# Patient Record
Sex: MALE | Race: WHITE | NOT HISPANIC OR LATINO | URBAN - METROPOLITAN AREA
[De-identification: names, ages, dates, MRNs, and addresses within clinical notes are randomized per-mention and may not be internally consistent; named-entity substitution may affect disease eponyms.]

---

## 2018-01-16 ENCOUNTER — OUTPATIENT (OUTPATIENT)
Dept: OUTPATIENT SERVICES | Facility: HOSPITAL | Age: 83
LOS: 1 days | End: 2018-01-16
Payer: MEDICARE

## 2018-01-16 VITALS
DIASTOLIC BLOOD PRESSURE: 61 MMHG | RESPIRATION RATE: 16 BRPM | TEMPERATURE: 99 F | SYSTOLIC BLOOD PRESSURE: 150 MMHG | HEIGHT: 67 IN | WEIGHT: 141.1 LBS | HEART RATE: 61 BPM

## 2018-01-16 DIAGNOSIS — K40.30 UNILATERAL INGUINAL HERNIA, WITH OBSTRUCTION, WITHOUT GANGRENE, NOT SPECIFIED AS RECURRENT: ICD-10-CM

## 2018-01-16 DIAGNOSIS — E11.9 TYPE 2 DIABETES MELLITUS WITHOUT COMPLICATIONS: ICD-10-CM

## 2018-01-16 DIAGNOSIS — Z90.89 ACQUIRED ABSENCE OF OTHER ORGANS: Chronic | ICD-10-CM

## 2018-01-16 DIAGNOSIS — Z01.818 ENCOUNTER FOR OTHER PREPROCEDURAL EXAMINATION: ICD-10-CM

## 2018-01-16 DIAGNOSIS — Z98.890 OTHER SPECIFIED POSTPROCEDURAL STATES: Chronic | ICD-10-CM

## 2018-01-16 LAB
ALBUMIN SERPL ELPH-MCNC: 3.4 G/DL — SIGNIFICANT CHANGE UP (ref 3.3–5)
ALP SERPL-CCNC: 44 U/L — SIGNIFICANT CHANGE UP (ref 40–120)
ALT FLD-CCNC: 29 U/L — SIGNIFICANT CHANGE UP (ref 12–78)
ANION GAP SERPL CALC-SCNC: 5 MMOL/L — SIGNIFICANT CHANGE UP (ref 5–17)
AST SERPL-CCNC: 16 U/L — SIGNIFICANT CHANGE UP (ref 15–37)
BILIRUB SERPL-MCNC: 0.6 MG/DL — SIGNIFICANT CHANGE UP (ref 0.2–1.2)
BUN SERPL-MCNC: 22 MG/DL — SIGNIFICANT CHANGE UP (ref 7–23)
CALCIUM SERPL-MCNC: 8.2 MG/DL — LOW (ref 8.5–10.1)
CHLORIDE SERPL-SCNC: 108 MMOL/L — SIGNIFICANT CHANGE UP (ref 96–108)
CO2 SERPL-SCNC: 31 MMOL/L — SIGNIFICANT CHANGE UP (ref 22–31)
CREAT SERPL-MCNC: 0.67 MG/DL — SIGNIFICANT CHANGE UP (ref 0.5–1.3)
GLUCOSE SERPL-MCNC: 211 MG/DL — HIGH (ref 70–99)
HBA1C BLD-MCNC: 7.1 % — HIGH (ref 4–5.6)
HCT VFR BLD CALC: 36.5 % — LOW (ref 39–50)
HGB BLD-MCNC: 12.6 G/DL — LOW (ref 13–17)
MCHC RBC-ENTMCNC: 30.2 PG — SIGNIFICANT CHANGE UP (ref 27–34)
MCHC RBC-ENTMCNC: 34.6 GM/DL — SIGNIFICANT CHANGE UP (ref 32–36)
MCV RBC AUTO: 87.3 FL — SIGNIFICANT CHANGE UP (ref 80–100)
PLATELET # BLD AUTO: 122 K/UL — LOW (ref 150–400)
POTASSIUM SERPL-MCNC: 4.1 MMOL/L — SIGNIFICANT CHANGE UP (ref 3.5–5.3)
POTASSIUM SERPL-SCNC: 4.1 MMOL/L — SIGNIFICANT CHANGE UP (ref 3.5–5.3)
PROT SERPL-MCNC: 6.7 G/DL — SIGNIFICANT CHANGE UP (ref 6–8.3)
RBC # BLD: 4.18 M/UL — LOW (ref 4.2–5.8)
RBC # FLD: 11.8 % — SIGNIFICANT CHANGE UP (ref 10.3–14.5)
SODIUM SERPL-SCNC: 144 MMOL/L — SIGNIFICANT CHANGE UP (ref 135–145)
WBC # BLD: 5.4 K/UL — SIGNIFICANT CHANGE UP (ref 3.8–10.5)
WBC # FLD AUTO: 5.4 K/UL — SIGNIFICANT CHANGE UP (ref 3.8–10.5)

## 2018-01-16 PROCEDURE — 80053 COMPREHEN METABOLIC PANEL: CPT

## 2018-01-16 PROCEDURE — G0463: CPT

## 2018-01-16 PROCEDURE — 93005 ELECTROCARDIOGRAM TRACING: CPT

## 2018-01-16 PROCEDURE — 93010 ELECTROCARDIOGRAM REPORT: CPT

## 2018-01-16 PROCEDURE — 85027 COMPLETE CBC AUTOMATED: CPT

## 2018-01-16 PROCEDURE — 83036 HEMOGLOBIN GLYCOSYLATED A1C: CPT

## 2018-01-16 RX ORDER — SILODOSIN 4 MG/1
0 CAPSULE ORAL
Qty: 0 | Refills: 0 | COMMUNITY

## 2018-01-16 NOTE — H&P PST ADULT - HISTORY OF PRESENT ILLNESS
87 yo [facility] male with T2DM, presents to PST scheduled for a repair of left inguinal hernia with mesh on 1/26 with Dr. Alvarado. Report left groin "burning" with bulging. H/O BPH with nocturia and frequency.  Denies hematuria.

## 2018-01-16 NOTE — H&P PST ADULT - PMH
Benign prostatic hyperplasia, unspecified whether lower urinary tract symptoms present    BPH associated with nocturia    Type 2 diabetes mellitus without complication, without long-term current use of insulin    Unilateral inguinal hernia with obstruction and without gangrene, recurrence not specified

## 2018-01-16 NOTE — H&P PST ADULT - NSANTHOSAYNRD_GEN_A_CORE
No. MARLA screening performed.  STOP BANG Legend: 0-2 = LOW Risk; 3-4 = INTERMEDIATE Risk; 5-8 = HIGH Risk

## 2018-01-16 NOTE — H&P PST ADULT - PROBLEM SELECTOR PLAN 2
Labs- CBC, CMP, A1c and EKG  MC with Dr. Macias  Pre op and Hibiclens instructions reviewed and given to son. Take routine am meds DOS with sip of water. Avoid NSAIDs and OTC supplements. Verbalized understanding

## 2018-01-16 NOTE — H&P PST ADULT - PROBLEM SELECTOR PLAN 3
A1c pending. Advised son that patient would need endo clearance if a1c comes back 9 or above. Hold Januvia DOS. Fingerstick am of surgery. Verbalized understanding.

## 2018-01-16 NOTE — H&P PST ADULT - ASSESSMENT
89 yo male with a left inguinal hernia scheduled for a repair of left inguinal hernia with mesh on 1/26 with Dr. Alvarado

## 2018-01-25 RX ORDER — GLUCAGON INJECTION, SOLUTION 0.5 MG/.1ML
1 INJECTION, SOLUTION SUBCUTANEOUS ONCE
Qty: 0 | Refills: 0 | Status: DISCONTINUED | OUTPATIENT
Start: 2018-01-26 | End: 2018-02-10

## 2018-01-25 RX ORDER — DEXTROSE 50 % IN WATER 50 %
25 SYRINGE (ML) INTRAVENOUS ONCE
Qty: 0 | Refills: 0 | Status: DISCONTINUED | OUTPATIENT
Start: 2018-01-26 | End: 2018-02-10

## 2018-01-25 RX ORDER — DEXTROSE 50 % IN WATER 50 %
12.5 SYRINGE (ML) INTRAVENOUS ONCE
Qty: 0 | Refills: 0 | Status: DISCONTINUED | OUTPATIENT
Start: 2018-01-26 | End: 2018-02-10

## 2018-01-25 RX ORDER — DEXTROSE 50 % IN WATER 50 %
1 SYRINGE (ML) INTRAVENOUS ONCE
Qty: 0 | Refills: 0 | Status: DISCONTINUED | OUTPATIENT
Start: 2018-01-26 | End: 2018-02-10

## 2018-01-25 RX ORDER — SODIUM CHLORIDE 9 MG/ML
1000 INJECTION, SOLUTION INTRAVENOUS
Qty: 0 | Refills: 0 | Status: DISCONTINUED | OUTPATIENT
Start: 2018-01-26 | End: 2018-02-10

## 2018-01-26 ENCOUNTER — OUTPATIENT (OUTPATIENT)
Dept: OUTPATIENT SERVICES | Facility: HOSPITAL | Age: 83
LOS: 1 days | End: 2018-01-26
Payer: MEDICARE

## 2018-01-26 VITALS
SYSTOLIC BLOOD PRESSURE: 145 MMHG | HEART RATE: 64 BPM | RESPIRATION RATE: 15 BRPM | DIASTOLIC BLOOD PRESSURE: 66 MMHG | TEMPERATURE: 98 F | OXYGEN SATURATION: 96 %

## 2018-01-26 VITALS — DIASTOLIC BLOOD PRESSURE: 66 MMHG | SYSTOLIC BLOOD PRESSURE: 120 MMHG | HEART RATE: 75 BPM | TEMPERATURE: 98 F

## 2018-01-26 DIAGNOSIS — K40.30 UNILATERAL INGUINAL HERNIA, WITH OBSTRUCTION, WITHOUT GANGRENE, NOT SPECIFIED AS RECURRENT: ICD-10-CM

## 2018-01-26 DIAGNOSIS — Z90.89 ACQUIRED ABSENCE OF OTHER ORGANS: Chronic | ICD-10-CM

## 2018-01-26 DIAGNOSIS — Z98.890 OTHER SPECIFIED POSTPROCEDURAL STATES: Chronic | ICD-10-CM

## 2018-01-26 PROCEDURE — 49507 PRP I/HERN INIT BLOCK >5 YR: CPT | Mod: LT

## 2018-01-26 PROCEDURE — 88304 TISSUE EXAM BY PATHOLOGIST: CPT | Mod: 26

## 2018-01-26 PROCEDURE — 82962 GLUCOSE BLOOD TEST: CPT

## 2018-01-26 PROCEDURE — C1781: CPT

## 2018-01-26 PROCEDURE — 88304 TISSUE EXAM BY PATHOLOGIST: CPT

## 2018-01-26 RX ORDER — OXYCODONE HYDROCHLORIDE 5 MG/1
5 TABLET ORAL EVERY 4 HOURS
Qty: 0 | Refills: 0 | Status: DISCONTINUED | OUTPATIENT
Start: 2018-01-26 | End: 2018-01-26

## 2018-01-26 RX ORDER — SODIUM CHLORIDE 9 MG/ML
1000 INJECTION, SOLUTION INTRAVENOUS
Qty: 0 | Refills: 0 | Status: DISCONTINUED | OUTPATIENT
Start: 2018-01-26 | End: 2018-01-26

## 2018-01-26 RX ORDER — ONDANSETRON 8 MG/1
4 TABLET, FILM COATED ORAL ONCE
Qty: 0 | Refills: 0 | Status: DISCONTINUED | OUTPATIENT
Start: 2018-01-26 | End: 2018-01-26

## 2018-01-26 RX ORDER — CEFAZOLIN SODIUM 1 G
1000 VIAL (EA) INJECTION ONCE
Qty: 0 | Refills: 0 | Status: COMPLETED | OUTPATIENT
Start: 2018-01-26 | End: 2018-01-26

## 2018-01-26 RX ORDER — HYDROMORPHONE HYDROCHLORIDE 2 MG/ML
0.5 INJECTION INTRAMUSCULAR; INTRAVENOUS; SUBCUTANEOUS
Qty: 0 | Refills: 0 | Status: DISCONTINUED | OUTPATIENT
Start: 2018-01-26 | End: 2018-01-26

## 2018-01-26 RX ORDER — ACETAMINOPHEN 500 MG
1000 TABLET ORAL ONCE
Qty: 0 | Refills: 0 | Status: DISCONTINUED | OUTPATIENT
Start: 2018-01-26 | End: 2018-01-26

## 2018-01-26 RX ADMIN — SODIUM CHLORIDE 100 MILLILITER(S): 9 INJECTION, SOLUTION INTRAVENOUS at 07:18

## 2018-01-26 RX ADMIN — SODIUM CHLORIDE 100 MILLILITER(S): 9 INJECTION, SOLUTION INTRAVENOUS at 09:18

## 2018-01-26 NOTE — BRIEF OPERATIVE NOTE - COMMENTS
Repair Incarcerated Left Inguinal Hernia with Lipoma Spermatic Cord Ilioinguinal, Ilioinguinal Nerve Block

## 2018-01-26 NOTE — BRIEF OPERATIVE NOTE - POST-OP DX
Inguinal hernia  01/26/2018  Incarcerated Left Inguinal Hernia with Lipoma Spermatic Cord  Active  Ananth Alvarado

## 2018-01-26 NOTE — ASU DISCHARGE PLAN (ADULT/PEDIATRIC). - MEDICATION SUMMARY - MEDICATIONS TO TAKE
I will START or STAY ON the medications listed below when I get home from the hospital:    finasteride 5 mg oral tablet  -- 1 tab(s) by mouth once a day - IN AM  -- Indication: For HOME MEDICATION     Tylenol 325 mg oral tablet  -- 2 tab(s) by mouth every 4 hours  -- Indication: For PAIN    Norco 5 mg-325 mg oral tablet  -- 2 tab(s) by mouth every 6 hours MDD:8  -- Caution federal law prohibits the transfer of this drug to any person other  than the person for whom it was prescribed.  May cause drowsiness.  Alcohol may intensify this effect.  Use care when operating dangerous machinery.  This product contains acetaminophen.  Do not use  with any other product containing acetaminophen to prevent possible liver damage.  Using more of this medication than prescribed may cause serious breathing problems.    -- Indication: For PAIN    Milk of Magnesia  -- 30 CC BY MOUTH  FOR CONSTIPATION    -- Indication: For CONSTIPATION     Rapaflo 8 mg oral capsule  -- 1 cap(s) by mouth once a day - IN AM  -- Indication: For HOME MEDICATION     Januvia 100 mg oral tablet  -- 1 tab(s) by mouth once a day - IN AM  -- Indication: For HOME MEDICATION     pravastatin 10 mg oral tablet  -- 1 tab(s) by mouth once a day (at bedtime)  -- Indication: For HOME MEDICATION     Levobunolol 0.5% ophthalmic solution  -- 2 drop(s) to each affected eye once a day RIGHT EYE  -- Indication: For HOME MEDICATION

## 2018-01-26 NOTE — ASU DISCHARGE PLAN (ADULT/PEDIATRIC). - NOTIFY
Fever greater than 101/Unable to Urinate/Bleeding that does not stop Bleeding that does not stop/Fever greater than 101/Unable to Urinate/Pain not relieved by Medications

## 2018-01-29 LAB — SURGICAL PATHOLOGY FINAL REPORT - CH: SIGNIFICANT CHANGE UP

## 2019-11-20 ENCOUNTER — EMERGENCY (EMERGENCY)
Facility: HOSPITAL | Age: 84
LOS: 1 days | Discharge: HOME | End: 2019-11-20
Attending: EMERGENCY MEDICINE | Admitting: EMERGENCY MEDICINE
Payer: MEDICARE

## 2019-11-20 VITALS
OXYGEN SATURATION: 98 % | HEART RATE: 80 BPM | SYSTOLIC BLOOD PRESSURE: 135 MMHG | WEIGHT: 149.91 LBS | TEMPERATURE: 98 F | DIASTOLIC BLOOD PRESSURE: 70 MMHG | HEIGHT: 68 IN | RESPIRATION RATE: 16 BRPM

## 2019-11-20 DIAGNOSIS — Z98.890 OTHER SPECIFIED POSTPROCEDURAL STATES: Chronic | ICD-10-CM

## 2019-11-20 DIAGNOSIS — Z90.89 ACQUIRED ABSENCE OF OTHER ORGANS: Chronic | ICD-10-CM

## 2019-11-20 PROCEDURE — 70450 CT HEAD/BRAIN W/O DYE: CPT

## 2019-11-20 PROCEDURE — 99284 EMERGENCY DEPT VISIT MOD MDM: CPT | Mod: 25

## 2019-11-20 PROCEDURE — 70450 CT HEAD/BRAIN W/O DYE: CPT | Mod: 26

## 2019-11-20 PROCEDURE — 99284 EMERGENCY DEPT VISIT MOD MDM: CPT

## 2019-11-20 RX ORDER — FINASTERIDE 5 MG/1
1 TABLET, FILM COATED ORAL
Qty: 0 | Refills: 0 | DISCHARGE

## 2019-11-20 RX ORDER — SILODOSIN 4 MG/1
1 CAPSULE ORAL
Qty: 0 | Refills: 0 | DISCHARGE

## 2019-11-20 RX ORDER — LEVOBUNOLOL HCL 0.5 %
2 DROPS OPHTHALMIC (EYE)
Qty: 0 | Refills: 0 | DISCHARGE

## 2019-11-20 RX ORDER — MAGNESIUM HYDROXIDE 400 MG/1
0 TABLET, CHEWABLE ORAL
Qty: 0 | Refills: 0 | DISCHARGE

## 2019-11-20 RX ORDER — ACETAMINOPHEN 500 MG
2 TABLET ORAL
Qty: 0 | Refills: 0 | DISCHARGE

## 2019-11-20 RX ORDER — SITAGLIPTIN 50 MG/1
1 TABLET, FILM COATED ORAL
Qty: 0 | Refills: 0 | DISCHARGE

## 2019-11-20 NOTE — ED ADULT TRIAGE NOTE - CHIEF COMPLAINT QUOTE
unwitnessed fall, ems found patient sitting on the wheelchair, unknown LOC, hematoma to the right side of forehead

## 2019-11-20 NOTE — ED PROVIDER NOTE - PATIENT PORTAL LINK FT
You can access the FollowMyHealth Patient Portal offered by [facility] by registering at the following website: [email or web address]. By joining [facility]’s FollowMyHealth portal, you will also be able to view your health information using other applications (apps) compatible with our system.

## 2019-11-20 NOTE — ED ADULT NURSE NOTE - NSIMPLEMENTINTERV_GEN_ALL_ED
Implemented All Fall Risk Interventions:  [place] to call system. Call bell, personal items and telephone within reach. Instruct patient to call for assistance. Room bathroom lighting operational. Non-slip footwear when patient is off stretcher. Physically safe environment: no spills, clutter or unnecessary equipment. Stretcher in lowest position, wheels locked, appropriate side rails in place. Provide visual cue, wrist band, yellow gown, etc. Monitor gait and stability. Monitor for mental status changes and reorient to person, place, and time. Review medications for side effects contributing to fall risk. Reinforce activity limits and safety measures with patient and family.

## 2019-11-20 NOTE — ED PROVIDER NOTE - ATTENDING CONTRIBUTION TO CARE
I have personally performed a face to face diagnostic evaluation on this patient.  I have reviewed the PA note and agree with the history, exam, and plan of care, except as noted.  History and Exam by me shows patient sent from [facility] with report of fall out of wheelchair, hit head, patient has dementia, son at the bedside states patient is at normal mental baseline, patient awake, follows commands, right forehead hematoma/eccymosis, no laceration, f/u ct head.

## 2019-11-20 NOTE — ED ADULT NURSE NOTE - OBJECTIVE STATEMENT
Per son, pt fell while trying to get out of his wheelchair at [facility] yesterday and has a lump on his head on the right temporal/forehead region, pt has dementia, but denies pain, and son states that he is not on blood thinners.

## 2019-11-20 NOTE — ED PROVIDER NOTE - OBJECTIVE STATEMENT
pt Is a 89yo male with pmhx of dementia and dm presents s/p fall. pt at nursing [facility], per nursing assistant pt fell out of wheelchair when she turned around. unsure if pt had episode of loc. pt son at bedside advised pt acting himself.

## 2019-11-21 VITALS
HEART RATE: 61 BPM | SYSTOLIC BLOOD PRESSURE: 124 MMHG | TEMPERATURE: 97 F | DIASTOLIC BLOOD PRESSURE: 71 MMHG | OXYGEN SATURATION: 98 % | RESPIRATION RATE: 16 BRPM

## 2019-11-21 PROBLEM — N40.0 BENIGN PROSTATIC HYPERPLASIA WITHOUT LOWER URINARY TRACT SYMPTOMS: Chronic | Status: ACTIVE | Noted: 2018-01-16

## 2019-11-21 PROBLEM — E11.9 TYPE 2 DIABETES MELLITUS WITHOUT COMPLICATIONS: Chronic | Status: ACTIVE | Noted: 2018-01-16

## 2019-11-21 PROBLEM — N40.1 BENIGN PROSTATIC HYPERPLASIA WITH LOWER URINARY TRACT SYMPTOMS: Chronic | Status: ACTIVE | Noted: 2018-01-16

## 2019-11-21 PROBLEM — K40.30 UNILATERAL INGUINAL HERNIA, WITH OBSTRUCTION, WITHOUT GANGRENE, NOT SPECIFIED AS RECURRENT: Chronic | Status: ACTIVE | Noted: 2018-01-16

## 2019-11-21 NOTE — ED ADULT NURSE REASSESSMENT NOTE - NS ED NURSE REASSESS COMMENT FT1
Report given to Ira MENDOZA at [employer] at [place]. Ambulance booked for pt to return to facility.

## 2020-08-15 ENCOUNTER — EMERGENCY (EMERGENCY)
Facility: HOSPITAL | Age: 85
LOS: 1 days | Discharge: ANOTHER HOSPITAL | End: 2020-08-15
Attending: EMERGENCY MEDICINE | Admitting: EMERGENCY MEDICINE
Payer: MEDICARE

## 2020-08-15 ENCOUNTER — INPATIENT (INPATIENT)
Facility: HOSPITAL | Age: 85
LOS: 1 days | Discharge: NURSING, REHAB OR LONG-TERM CARE | DRG: 640 | End: 2020-08-17
Attending: INTERNAL MEDICINE | Admitting: INTERNAL MEDICINE
Payer: MEDICARE

## 2020-08-15 VITALS
DIASTOLIC BLOOD PRESSURE: 70 MMHG | HEART RATE: 67 BPM | TEMPERATURE: 98 F | HEIGHT: 67 IN | SYSTOLIC BLOOD PRESSURE: 171 MMHG | OXYGEN SATURATION: 96 % | RESPIRATION RATE: 18 BRPM

## 2020-08-15 VITALS
HEIGHT: 67 IN | RESPIRATION RATE: 18 BRPM | DIASTOLIC BLOOD PRESSURE: 60 MMHG | OXYGEN SATURATION: 100 % | SYSTOLIC BLOOD PRESSURE: 151 MMHG | WEIGHT: 154.98 LBS | HEART RATE: 70 BPM | TEMPERATURE: 97 F

## 2020-08-15 VITALS
HEART RATE: 66 BPM | SYSTOLIC BLOOD PRESSURE: 170 MMHG | RESPIRATION RATE: 18 BRPM | DIASTOLIC BLOOD PRESSURE: 79 MMHG | OXYGEN SATURATION: 96 % | TEMPERATURE: 98 F

## 2020-08-15 DIAGNOSIS — Z29.9 ENCOUNTER FOR PROPHYLACTIC MEASURES, UNSPECIFIED: ICD-10-CM

## 2020-08-15 DIAGNOSIS — E87.1 HYPO-OSMOLALITY AND HYPONATREMIA: ICD-10-CM

## 2020-08-15 DIAGNOSIS — E11.9 TYPE 2 DIABETES MELLITUS WITHOUT COMPLICATIONS: ICD-10-CM

## 2020-08-15 DIAGNOSIS — F03.91 UNSPECIFIED DEMENTIA WITH BEHAVIORAL DISTURBANCE: ICD-10-CM

## 2020-08-15 DIAGNOSIS — Z90.89 ACQUIRED ABSENCE OF OTHER ORGANS: Chronic | ICD-10-CM

## 2020-08-15 DIAGNOSIS — Z98.890 OTHER SPECIFIED POSTPROCEDURAL STATES: Chronic | ICD-10-CM

## 2020-08-15 DIAGNOSIS — R41.82 ALTERED MENTAL STATUS, UNSPECIFIED: ICD-10-CM

## 2020-08-15 LAB
ALBUMIN SERPL ELPH-MCNC: 3.2 G/DL — LOW (ref 3.3–5)
ALP SERPL-CCNC: 81 U/L — SIGNIFICANT CHANGE UP (ref 30–120)
ALT FLD-CCNC: 38 U/L DA — SIGNIFICANT CHANGE UP (ref 10–60)
ANION GAP SERPL CALC-SCNC: 0 MMOL/L — LOW (ref 5–17)
APPEARANCE UR: CLEAR — SIGNIFICANT CHANGE UP
AST SERPL-CCNC: 33 U/L — SIGNIFICANT CHANGE UP (ref 10–40)
BASOPHILS # BLD AUTO: 0.03 K/UL — SIGNIFICANT CHANGE UP (ref 0–0.2)
BASOPHILS NFR BLD AUTO: 0.7 % — SIGNIFICANT CHANGE UP (ref 0–2)
BILIRUB SERPL-MCNC: 0.6 MG/DL — SIGNIFICANT CHANGE UP (ref 0.2–1.2)
BILIRUB UR-MCNC: NEGATIVE — SIGNIFICANT CHANGE UP
BUN SERPL-MCNC: 19 MG/DL — SIGNIFICANT CHANGE UP (ref 7–23)
CALCIUM SERPL-MCNC: 8.5 MG/DL — SIGNIFICANT CHANGE UP (ref 8.4–10.5)
CHLORIDE SERPL-SCNC: 93 MMOL/L — LOW (ref 96–108)
CO2 SERPL-SCNC: 36 MMOL/L — HIGH (ref 22–31)
COLOR SPEC: YELLOW — SIGNIFICANT CHANGE UP
CREAT SERPL-MCNC: 0.8 MG/DL — SIGNIFICANT CHANGE UP (ref 0.5–1.3)
DIFF PNL FLD: NEGATIVE — SIGNIFICANT CHANGE UP
EOSINOPHIL # BLD AUTO: 0.18 K/UL — SIGNIFICANT CHANGE UP (ref 0–0.5)
EOSINOPHIL NFR BLD AUTO: 4 % — SIGNIFICANT CHANGE UP (ref 0–6)
GLUCOSE BLDC GLUCOMTR-MCNC: 145 MG/DL — HIGH (ref 70–99)
GLUCOSE SERPL-MCNC: 126 MG/DL — HIGH (ref 70–99)
GLUCOSE UR QL: NEGATIVE MG/DL — SIGNIFICANT CHANGE UP
HCT VFR BLD CALC: 33.7 % — LOW (ref 39–50)
HGB BLD-MCNC: 11.2 G/DL — LOW (ref 13–17)
IMM GRANULOCYTES NFR BLD AUTO: 0.2 % — SIGNIFICANT CHANGE UP (ref 0–1.5)
KETONES UR-MCNC: NEGATIVE — SIGNIFICANT CHANGE UP
LACTATE SERPL-SCNC: 2 MMOL/L — SIGNIFICANT CHANGE UP (ref 0.7–2)
LEUKOCYTE ESTERASE UR-ACNC: NEGATIVE — SIGNIFICANT CHANGE UP
LYMPHOCYTES # BLD AUTO: 1.25 K/UL — SIGNIFICANT CHANGE UP (ref 1–3.3)
LYMPHOCYTES # BLD AUTO: 27.9 % — SIGNIFICANT CHANGE UP (ref 13–44)
MCHC RBC-ENTMCNC: 29.6 PG — SIGNIFICANT CHANGE UP (ref 27–34)
MCHC RBC-ENTMCNC: 33.2 GM/DL — SIGNIFICANT CHANGE UP (ref 32–36)
MCV RBC AUTO: 89.2 FL — SIGNIFICANT CHANGE UP (ref 80–100)
MONOCYTES # BLD AUTO: 0.41 K/UL — SIGNIFICANT CHANGE UP (ref 0–0.9)
MONOCYTES NFR BLD AUTO: 9.2 % — SIGNIFICANT CHANGE UP (ref 2–14)
NEUTROPHILS # BLD AUTO: 2.6 K/UL — SIGNIFICANT CHANGE UP (ref 1.8–7.4)
NEUTROPHILS NFR BLD AUTO: 58 % — SIGNIFICANT CHANGE UP (ref 43–77)
NITRITE UR-MCNC: NEGATIVE — SIGNIFICANT CHANGE UP
NRBC # BLD: 0 /100 WBCS — SIGNIFICANT CHANGE UP (ref 0–0)
NT-PROBNP SERPL-SCNC: 665 PG/ML — HIGH (ref 0–450)
PH UR: 8 — SIGNIFICANT CHANGE UP (ref 5–8)
PLATELET # BLD AUTO: 146 K/UL — LOW (ref 150–400)
POTASSIUM SERPL-MCNC: 4.4 MMOL/L — SIGNIFICANT CHANGE UP (ref 3.5–5.3)
POTASSIUM SERPL-SCNC: 4.4 MMOL/L — SIGNIFICANT CHANGE UP (ref 3.5–5.3)
PROT SERPL-MCNC: 7.4 G/DL — SIGNIFICANT CHANGE UP (ref 6–8.3)
PROT UR-MCNC: NEGATIVE MG/DL — SIGNIFICANT CHANGE UP
RBC # BLD: 3.78 M/UL — LOW (ref 4.2–5.8)
RBC # FLD: 13.9 % — SIGNIFICANT CHANGE UP (ref 10.3–14.5)
SARS-COV-2 RNA SPEC QL NAA+PROBE: SIGNIFICANT CHANGE UP
SODIUM SERPL-SCNC: 129 MMOL/L — LOW (ref 135–145)
SP GR SPEC: 1.01 — SIGNIFICANT CHANGE UP (ref 1.01–1.02)
TROPONIN I SERPL-MCNC: 0 NG/ML — LOW (ref 0.02–0.06)
UROBILINOGEN FLD QL: 1 MG/DL
VALPROATE SERPL-MCNC: 40 UG/ML — LOW (ref 50–100)
WBC # BLD: 4.48 K/UL — SIGNIFICANT CHANGE UP (ref 3.8–10.5)
WBC # FLD AUTO: 4.48 K/UL — SIGNIFICANT CHANGE UP (ref 3.8–10.5)

## 2020-08-15 PROCEDURE — 71045 X-RAY EXAM CHEST 1 VIEW: CPT | Mod: 26

## 2020-08-15 PROCEDURE — 83880 ASSAY OF NATRIURETIC PEPTIDE: CPT

## 2020-08-15 PROCEDURE — 96360 HYDRATION IV INFUSION INIT: CPT

## 2020-08-15 PROCEDURE — 70450 CT HEAD/BRAIN W/O DYE: CPT | Mod: 26

## 2020-08-15 PROCEDURE — 84484 ASSAY OF TROPONIN QUANT: CPT

## 2020-08-15 PROCEDURE — 87040 BLOOD CULTURE FOR BACTERIA: CPT

## 2020-08-15 PROCEDURE — 93005 ELECTROCARDIOGRAM TRACING: CPT

## 2020-08-15 PROCEDURE — 99283 EMERGENCY DEPT VISIT LOW MDM: CPT

## 2020-08-15 PROCEDURE — 70450 CT HEAD/BRAIN W/O DYE: CPT

## 2020-08-15 PROCEDURE — 83605 ASSAY OF LACTIC ACID: CPT

## 2020-08-15 PROCEDURE — 80164 ASSAY DIPROPYLACETIC ACD TOT: CPT

## 2020-08-15 PROCEDURE — 99285 EMERGENCY DEPT VISIT HI MDM: CPT | Mod: 25

## 2020-08-15 PROCEDURE — 85027 COMPLETE CBC AUTOMATED: CPT

## 2020-08-15 PROCEDURE — 99285 EMERGENCY DEPT VISIT HI MDM: CPT

## 2020-08-15 PROCEDURE — 87086 URINE CULTURE/COLONY COUNT: CPT

## 2020-08-15 PROCEDURE — 71045 X-RAY EXAM CHEST 1 VIEW: CPT

## 2020-08-15 PROCEDURE — 81003 URINALYSIS AUTO W/O SCOPE: CPT

## 2020-08-15 PROCEDURE — 93010 ELECTROCARDIOGRAM REPORT: CPT

## 2020-08-15 PROCEDURE — 36415 COLL VENOUS BLD VENIPUNCTURE: CPT

## 2020-08-15 PROCEDURE — 82962 GLUCOSE BLOOD TEST: CPT

## 2020-08-15 PROCEDURE — 80053 COMPREHEN METABOLIC PANEL: CPT

## 2020-08-15 RX ORDER — INSULIN LISPRO 100/ML
VIAL (ML) SUBCUTANEOUS
Refills: 0 | Status: DISCONTINUED | OUTPATIENT
Start: 2020-08-15 | End: 2020-08-17

## 2020-08-15 RX ORDER — DEXTROSE 50 % IN WATER 50 %
15 SYRINGE (ML) INTRAVENOUS ONCE
Refills: 0 | Status: DISCONTINUED | OUTPATIENT
Start: 2020-08-15 | End: 2020-08-17

## 2020-08-15 RX ORDER — QUETIAPINE FUMARATE 200 MG/1
25 TABLET, FILM COATED ORAL THREE TIMES A DAY
Refills: 0 | Status: DISCONTINUED | OUTPATIENT
Start: 2020-08-15 | End: 2020-08-17

## 2020-08-15 RX ORDER — DEXTROSE 50 % IN WATER 50 %
25 SYRINGE (ML) INTRAVENOUS ONCE
Refills: 0 | Status: DISCONTINUED | OUTPATIENT
Start: 2020-08-15 | End: 2020-08-17

## 2020-08-15 RX ORDER — SODIUM CHLORIDE 9 MG/ML
1000 INJECTION INTRAMUSCULAR; INTRAVENOUS; SUBCUTANEOUS
Refills: 0 | Status: DISCONTINUED | OUTPATIENT
Start: 2020-08-15 | End: 2020-08-17

## 2020-08-15 RX ORDER — DEXTROSE 50 % IN WATER 50 %
12.5 SYRINGE (ML) INTRAVENOUS ONCE
Refills: 0 | Status: DISCONTINUED | OUTPATIENT
Start: 2020-08-15 | End: 2020-08-17

## 2020-08-15 RX ORDER — LANOLIN ALCOHOL/MO/W.PET/CERES
5 CREAM (GRAM) TOPICAL AT BEDTIME
Refills: 0 | Status: DISCONTINUED | OUTPATIENT
Start: 2020-08-15 | End: 2020-08-17

## 2020-08-15 RX ORDER — ALOGLIPTIN 12.5 MG/1
1 TABLET, FILM COATED ORAL
Qty: 0 | Refills: 0 | DISCHARGE

## 2020-08-15 RX ORDER — SODIUM CHLORIDE 9 MG/ML
1000 INJECTION INTRAMUSCULAR; INTRAVENOUS; SUBCUTANEOUS
Refills: 0 | Status: COMPLETED | OUTPATIENT
Start: 2020-08-15 | End: 2020-08-15

## 2020-08-15 RX ORDER — SODIUM CHLORIDE 9 MG/ML
1000 INJECTION, SOLUTION INTRAVENOUS
Refills: 0 | Status: DISCONTINUED | OUTPATIENT
Start: 2020-08-15 | End: 2020-08-17

## 2020-08-15 RX ORDER — LANOLIN ALCOHOL/MO/W.PET/CERES
1 CREAM (GRAM) TOPICAL
Qty: 0 | Refills: 0 | DISCHARGE

## 2020-08-15 RX ORDER — ASPIRIN/CALCIUM CARB/MAGNESIUM 324 MG
1 TABLET ORAL
Qty: 0 | Refills: 0 | DISCHARGE

## 2020-08-15 RX ORDER — DIVALPROEX SODIUM 500 MG/1
250 TABLET, DELAYED RELEASE ORAL
Refills: 0 | Status: DISCONTINUED | OUTPATIENT
Start: 2020-08-15 | End: 2020-08-17

## 2020-08-15 RX ORDER — RISPERIDONE 4 MG/1
0 TABLET ORAL
Qty: 0 | Refills: 0 | DISCHARGE

## 2020-08-15 RX ORDER — ENOXAPARIN SODIUM 100 MG/ML
40 INJECTION SUBCUTANEOUS DAILY
Refills: 0 | Status: DISCONTINUED | OUTPATIENT
Start: 2020-08-15 | End: 2020-08-17

## 2020-08-15 RX ORDER — GLUCAGON INJECTION, SOLUTION 0.5 MG/.1ML
1 INJECTION, SOLUTION SUBCUTANEOUS ONCE
Refills: 0 | Status: DISCONTINUED | OUTPATIENT
Start: 2020-08-15 | End: 2020-08-17

## 2020-08-15 RX ADMIN — SODIUM CHLORIDE 75 MILLILITER(S): 9 INJECTION INTRAMUSCULAR; INTRAVENOUS; SUBCUTANEOUS at 23:10

## 2020-08-15 RX ADMIN — SODIUM CHLORIDE 1000 MILLILITER(S): 9 INJECTION INTRAMUSCULAR; INTRAVENOUS; SUBCUTANEOUS at 18:00

## 2020-08-15 RX ADMIN — SODIUM CHLORIDE 1000 MILLILITER(S): 9 INJECTION INTRAMUSCULAR; INTRAVENOUS; SUBCUTANEOUS at 16:22

## 2020-08-15 RX ADMIN — SODIUM CHLORIDE 1000 MILLILITER(S): 9 INJECTION INTRAMUSCULAR; INTRAVENOUS; SUBCUTANEOUS at 18:37

## 2020-08-15 NOTE — ED ADULT NURSE NOTE - PMH
Benign prostatic hyperplasia, unspecified whether lower urinary tract symptoms present    BPH associated with nocturia    Dementia    Type 2 diabetes mellitus without complication, without long-term current use of insulin    Unilateral inguinal hernia with obstruction and without gangrene, recurrence not specified

## 2020-08-15 NOTE — ED PROVIDER NOTE - CARE PLAN
Principal Discharge DX:	Hyponatremia  Secondary Diagnosis:	Dementia  Secondary Diagnosis:	AMS (altered mental status)

## 2020-08-15 NOTE — H&P ADULT - NSICDXPASTMEDICALHX_GEN_ALL_CORE_FT
PAST MEDICAL HISTORY:  Benign prostatic hyperplasia, unspecified whether lower urinary tract symptoms present     BPH associated with nocturia     Dementia     Type 2 diabetes mellitus without complication, without long-term current use of insulin     Unilateral inguinal hernia with obstruction and without gangrene, recurrence not specified

## 2020-08-15 NOTE — H&P ADULT - HISTORY OF PRESENT ILLNESS
89yo male resident of [facility] with PMH of Dementia with behav disorder,psychosis, dysphagia,DM2,BPH, dermatitis, sent in for AMS, with worsening confusion, In ED afebrile, vital signs stable, hyponatremia ,  ADmitted for AMS likely metabolic encephalopathy due to hyponatremia   worsening Dementia with behavioural disorder

## 2020-08-15 NOTE — H&P ADULT - ASSESSMENT
89yo male resident of [facility] with PMH of Dementia with behav disorder,psychosis, dysphagia,DM2,BPH, dermatitis, sent in for AMS, with worsening confusion, In ED afebrile, vital signs stable, hyponatremia ,  ADmitted for AMS likely metabolic encephalopathy due to hyponatremia   worsening Dementia with behavioural disorder  goals of care and advanced care plan to be discussed with family  family called and message left    45 minutes spent on this visit, 50% visit time spent in care co-ordination with other attendings and counselling patient

## 2020-08-15 NOTE — ED PROVIDER NOTE - MUSCULOSKELETAL, MLM
Spine appears normal, range of motion is not limited, no muscle or joint tenderness +1 pitting edema, in lower legs

## 2020-08-15 NOTE — ED PROVIDER NOTE - PROGRESS NOTE DETAILS
Labs revealed hyponatremia, Dr Brizuela in to examine pt. Wants to transfer to [place] for admission. Dr Berrios accepting.

## 2020-08-15 NOTE — ED ADULT NURSE NOTE - OBJECTIVE STATEMENT
Pt. brought to ED, transferred from [facility] for hyponatremia. Pt. unable to provide details, oriented to self.

## 2020-08-15 NOTE — ED ADULT NURSE REASSESSMENT NOTE - NS ED NURSE REASSESS COMMENT FT1
spoke to son jese who is aware of plan of care. consents to transfer to [place] for admission. no distress or sob noted.

## 2020-08-15 NOTE — ED PROVIDER NOTE - OBJECTIVE STATEMENT
89yo male sent from [place] with ams and hyponatremia, pt is unable to give hx due to dementia but pt was found to have low sodium and was combative at the facility, no vomiting no fever

## 2020-08-15 NOTE — ED ADULT TRIAGE NOTE - CHIEF COMPLAINT QUOTE
As per EMS, sent from [place] [facility] with change in mental status, lethargic "  Pt awake, screaming/ agitated on arrival to ED

## 2020-08-15 NOTE — ED ADULT NURSE NOTE - NSIMPLEMENTINTERV_GEN_ALL_ED
Implemented All Fall Risk Interventions:  [place] to call system. Call bell, personal items and telephone within reach. Instruct patient to call for assistance. Room bathroom lighting operational. Non-slip footwear when patient is off stretcher. Physically safe environment: no spills, clutter or unnecessary equipment. Stretcher in lowest position, wheels locked, appropriate side rails in place. Provide visual cue, wrist band, yellow gown, etc. Monitor gait and stability. Monitor for mental status changes and reorient to person, place, and time. Review medications for side effects contributing to fall risk. Reinforce activity limits and safety measures with patient and family.

## 2020-08-15 NOTE — ED ADULT NURSE NOTE - NSIMPLEMENTINTERV_GEN_ALL_ED
Implemented All Fall with Harm Risk Interventions:  [place] to call system. Call bell, personal items and telephone within reach. Instruct patient to call for assistance. Room bathroom lighting operational. Non-slip footwear when patient is off stretcher. Physically safe environment: no spills, clutter or unnecessary equipment. Stretcher in lowest position, wheels locked, appropriate side rails in place. Provide visual cue, wrist band, yellow gown, etc. Monitor gait and stability. Monitor for mental status changes and reorient to person, place, and time. Review medications for side effects contributing to fall risk. Reinforce activity limits and safety measures with patient and family. Provide visual clues: red socks.

## 2020-08-15 NOTE — ED ADULT NURSE NOTE - OBJECTIVE STATEMENT
pt BIBEMS from [facility] for acute lethargy morning. pt has a PMHx of Dementia and Psychosis. Pt is a poor historian unable to obtain a reliable hx. pt screaming not making sense. pt knows his name and . pt has rash to right side abd / torso and dermatitis around his penis and groin area. pt maex4, no distress or sob noted.

## 2020-08-15 NOTE — H&P ADULT - ATTENDING COMMENTS
85 minutes spent on this visit, 50% visit time spent in care co-ordination with other attendings and counselling patient  I have discussed care plan with patient and HCP,expressed understanding of problems treatment and their effect and side effects, alternatives in detail,I have asked if they have any questions and concerns and appropriately addressed them to best of my ability  Reviewed all diagonostic tests, lab results and drug drug interactions, and medications

## 2020-08-16 DIAGNOSIS — B97.21 SARS-ASSOCIATED CORONAVIRUS AS THE CAUSE OF DISEASES CLASSIFIED ELSEWHERE: ICD-10-CM

## 2020-08-16 DIAGNOSIS — Z71.89 OTHER SPECIFIED COUNSELING: ICD-10-CM

## 2020-08-16 LAB
A1C WITH ESTIMATED AVERAGE GLUCOSE RESULT: 5.4 % — SIGNIFICANT CHANGE UP (ref 4–5.6)
ALBUMIN SERPL ELPH-MCNC: 2.6 G/DL — LOW (ref 3.3–5)
ALP SERPL-CCNC: 74 U/L — SIGNIFICANT CHANGE UP (ref 40–120)
ALT FLD-CCNC: 29 U/L — SIGNIFICANT CHANGE UP (ref 12–78)
ANION GAP SERPL CALC-SCNC: 3 MMOL/L — LOW (ref 5–17)
AST SERPL-CCNC: 28 U/L — SIGNIFICANT CHANGE UP (ref 15–37)
BASOPHILS # BLD AUTO: 0.02 K/UL — SIGNIFICANT CHANGE UP (ref 0–0.2)
BASOPHILS NFR BLD AUTO: 0.5 % — SIGNIFICANT CHANGE UP (ref 0–2)
BILIRUB SERPL-MCNC: 0.5 MG/DL — SIGNIFICANT CHANGE UP (ref 0.2–1.2)
BUN SERPL-MCNC: 12 MG/DL — SIGNIFICANT CHANGE UP (ref 7–23)
CALCIUM SERPL-MCNC: 8.1 MG/DL — LOW (ref 8.5–10.1)
CHLORIDE SERPL-SCNC: 101 MMOL/L — SIGNIFICANT CHANGE UP (ref 96–108)
CHOLEST SERPL-MCNC: 104 MG/DL — SIGNIFICANT CHANGE UP (ref 10–199)
CO2 SERPL-SCNC: 33 MMOL/L — HIGH (ref 22–31)
CREAT SERPL-MCNC: 0.56 MG/DL — SIGNIFICANT CHANGE UP (ref 0.5–1.3)
CULTURE RESULTS: NO GROWTH — SIGNIFICANT CHANGE UP
EOSINOPHIL # BLD AUTO: 0.19 K/UL — SIGNIFICANT CHANGE UP (ref 0–0.5)
EOSINOPHIL NFR BLD AUTO: 4.9 % — SIGNIFICANT CHANGE UP (ref 0–6)
ESTIMATED AVERAGE GLUCOSE: 108 MG/DL — SIGNIFICANT CHANGE UP (ref 68–114)
GLUCOSE SERPL-MCNC: 95 MG/DL — SIGNIFICANT CHANGE UP (ref 70–99)
HCT VFR BLD CALC: 31 % — LOW (ref 39–50)
HDLC SERPL-MCNC: 45 MG/DL — SIGNIFICANT CHANGE UP
HGB BLD-MCNC: 10.4 G/DL — LOW (ref 13–17)
IMM GRANULOCYTES NFR BLD AUTO: 0.3 % — SIGNIFICANT CHANGE UP (ref 0–1.5)
LIPID PNL WITH DIRECT LDL SERPL: 52 MG/DL — SIGNIFICANT CHANGE UP
LYMPHOCYTES # BLD AUTO: 0.68 K/UL — LOW (ref 1–3.3)
LYMPHOCYTES # BLD AUTO: 17.5 % — SIGNIFICANT CHANGE UP (ref 13–44)
MAGNESIUM SERPL-MCNC: 2 MG/DL — SIGNIFICANT CHANGE UP (ref 1.6–2.6)
MCHC RBC-ENTMCNC: 29.1 PG — SIGNIFICANT CHANGE UP (ref 27–34)
MCHC RBC-ENTMCNC: 33.5 GM/DL — SIGNIFICANT CHANGE UP (ref 32–36)
MCV RBC AUTO: 86.8 FL — SIGNIFICANT CHANGE UP (ref 80–100)
MONOCYTES # BLD AUTO: 0.33 K/UL — SIGNIFICANT CHANGE UP (ref 0–0.9)
MONOCYTES NFR BLD AUTO: 8.5 % — SIGNIFICANT CHANGE UP (ref 2–14)
NEUTROPHILS # BLD AUTO: 2.66 K/UL — SIGNIFICANT CHANGE UP (ref 1.8–7.4)
NEUTROPHILS NFR BLD AUTO: 68.3 % — SIGNIFICANT CHANGE UP (ref 43–77)
NRBC # BLD: 0 /100 WBCS — SIGNIFICANT CHANGE UP (ref 0–0)
PHOSPHATE SERPL-MCNC: 3.2 MG/DL — SIGNIFICANT CHANGE UP (ref 2.5–4.5)
PLATELET # BLD AUTO: 125 K/UL — LOW (ref 150–400)
POTASSIUM SERPL-MCNC: 4.5 MMOL/L — SIGNIFICANT CHANGE UP (ref 3.5–5.3)
POTASSIUM SERPL-SCNC: 4.5 MMOL/L — SIGNIFICANT CHANGE UP (ref 3.5–5.3)
PROT SERPL-MCNC: 6.1 G/DL — SIGNIFICANT CHANGE UP (ref 6–8.3)
RBC # BLD: 3.57 M/UL — LOW (ref 4.2–5.8)
RBC # FLD: 13.5 % — SIGNIFICANT CHANGE UP (ref 10.3–14.5)
SARS-COV-2 IGG SERPL QL IA: POSITIVE
SARS-COV-2 IGM SERPL IA-ACNC: 112 INDEX — HIGH
SODIUM SERPL-SCNC: 137 MMOL/L — SIGNIFICANT CHANGE UP (ref 135–145)
SPECIMEN SOURCE: SIGNIFICANT CHANGE UP
T3 SERPL-MCNC: 73 NG/DL — LOW (ref 80–200)
T4 AB SER-ACNC: 5.2 UG/DL — SIGNIFICANT CHANGE UP (ref 4.6–12)
TOTAL CHOLESTEROL/HDL RATIO MEASUREMENT: 2.3 RATIO — LOW (ref 3.4–9.6)
TRIGL SERPL-MCNC: 33 MG/DL — SIGNIFICANT CHANGE UP (ref 10–149)
TSH SERPL-MCNC: 4.39 UIU/ML — HIGH (ref 0.36–3.74)
WBC # BLD: 3.89 K/UL — SIGNIFICANT CHANGE UP (ref 3.8–10.5)
WBC # FLD AUTO: 3.89 K/UL — SIGNIFICANT CHANGE UP (ref 3.8–10.5)

## 2020-08-16 PROCEDURE — 71250 CT THORAX DX C-: CPT | Mod: 26

## 2020-08-16 RX ADMIN — Medication 5 MILLIGRAM(S): at 21:02

## 2020-08-16 RX ADMIN — SODIUM CHLORIDE 75 MILLILITER(S): 9 INJECTION INTRAMUSCULAR; INTRAVENOUS; SUBCUTANEOUS at 18:38

## 2020-08-16 RX ADMIN — QUETIAPINE FUMARATE 25 MILLIGRAM(S): 200 TABLET, FILM COATED ORAL at 13:41

## 2020-08-16 RX ADMIN — QUETIAPINE FUMARATE 25 MILLIGRAM(S): 200 TABLET, FILM COATED ORAL at 21:02

## 2020-08-16 RX ADMIN — DIVALPROEX SODIUM 250 MILLIGRAM(S): 500 TABLET, DELAYED RELEASE ORAL at 18:34

## 2020-08-16 RX ADMIN — QUETIAPINE FUMARATE 25 MILLIGRAM(S): 200 TABLET, FILM COATED ORAL at 05:50

## 2020-08-16 RX ADMIN — ENOXAPARIN SODIUM 40 MILLIGRAM(S): 100 INJECTION SUBCUTANEOUS at 13:40

## 2020-08-16 RX ADMIN — DIVALPROEX SODIUM 250 MILLIGRAM(S): 500 TABLET, DELAYED RELEASE ORAL at 05:50

## 2020-08-16 NOTE — PROGRESS NOTE ADULT - ATTENDING COMMENTS
45viewed all diagonostic tests, lab results and drug drug interactions, and medications  I have discussed care plan with patient and HCP,expressed understanding of problems treatment and their effect and side effects, alternatives in detail,I have asked if they have any questions and concerns and appropriately addressed them to best of my ability  Reviewed all diagonostic tests, lab results and drug drug interactions, and medications

## 2020-08-16 NOTE — CONSULT NOTE ADULT - SUBJECTIVE AND OBJECTIVE BOX
MAURICE WERTHEIMER    PLV [address]1    Patient is a 89y old  Male who presents with a chief complaint of AMS (16 Aug 2020 09:57)       Allergies    No Known Allergies    Intolerances        HPI:  89yo male resident of [facility] with PMH of Dementia with behav disorder,psychosis, dysphagia,DM2,BPH, dermatitis, sent in for AMS, with worsening confusion, In ED afebrile, vital signs stable, hyponatremia ,  ADmitted for AMS likely metabolic encephalopathy due to hyponatremia   worsening Dementia with behavioural disorder (15 Aug 2020 22:14)      PAST MEDICAL & SURGICAL HISTORY:  Dementia  BPH associated with nocturia  Benign prostatic hyperplasia, unspecified whether lower urinary tract symptoms present  Type 2 diabetes mellitus without complication, without long-term current use of insulin  Unilateral inguinal hernia with obstruction and without gangrene, recurrence not specified  H/O adenoidectomy  H/O eye surgery: &gt; 70 years ago      FAMILY HISTORY:  No pertinent family history in first degree relatives        MEDICATIONS   dextrose 40% Gel 15 Gram(s) Oral once PRN  dextrose 5%. 1000 milliLiter(s) IV Continuous <Continuous>  dextrose 50% Injectable 12.5 Gram(s) IV Push once  dextrose 50% Injectable 25 Gram(s) IV Push once  dextrose 50% Injectable 25 Gram(s) IV Push once  diVALproex  milliGRAM(s) Oral two times a day  enoxaparin Injectable 40 milliGRAM(s) SubCutaneous daily  glucagon  Injectable 1 milliGRAM(s) IntraMuscular once PRN  insulin lispro (HumaLOG) corrective regimen sliding scale   SubCutaneous three times a day before meals  melatonin 5 milliGRAM(s) Oral at bedtime  QUEtiapine 25 milliGRAM(s) Oral three times a day  sodium chloride 0.9%. 1000 milliLiter(s) IV Continuous <Continuous>      Vital Signs Last 24 Hrs  T(C): 36.5 (16 Aug 2020 06:32), Max: 36.7 (15 Aug 2020 23:00)  T(F): 97.7 (16 Aug 2020 06:32), Max: 98.1 (16 Aug 2020 01:31)  HR: 61 (16 Aug 2020 06:32) (56 - 71)  BP: 156/54 (16 Aug 2020 06:32) (144/67 - 171/70)  BP(mean): --  RR: 18 (16 Aug 2020 06:32) (18 - 20)  SpO2: 98% (16 Aug 2020 06:32) (96% - 100%)            LABS:                        10.4   3.89  )-----------( 125      ( 16 Aug 2020 07:13 )             31.0         137  |  101  |  12  ----------------------------<  95  4.5   |  33<H>  |  0.56    Ca    8.1<L>      16 Aug 2020 07:13  Phos  3.2       Mg     2.0         TPro  6.1  /  Alb  2.6<L>  /  TBili  0.5  /  DBili  x   /  AST  28  /  ALT  29  /  AlkPhos  74        Urinalysis Basic - ( 15 Aug 2020 16:28 )    Color: Yellow / Appearance: Clear / S.015 / pH: x  Gluc: x / Ketone: Negative  / Bili: Negative / Urobili: 1 mg/dL   Blood: x / Protein: Negative mg/dL / Nitrite: Negative   Leuk Esterase: Negative / RBC: x / WBC x   Sq Epi: x / Non Sq Epi: x / Bacteria: x            WBC:  WBC Count: 3.89 K/uL ( @ 07:13)  WBC Count: 4.48 K/uL (08-15 @ 16:26)      MICROBIOLOGY:  RECENT CULTURES:        CARDIAC MARKERS ( 15 Aug 2020 16:26 )  .000 ng/mL / x     / x     / x     / x                Sodium:  Sodium, Serum: 137 mmol/L ( @ 07:13)  Sodium, Serum: 129 mmol/L (08-15 @ 16:26)      0.56 mg/dL  @ 07:13  0.80 mg/dL 08-15 @ 16:26      Hemoglobin:  Hemoglobin: 10.4 g/dL ( @ 07:13)  Hemoglobin: 11.2 g/dL (08-15 @ 16:26)      Platelets: Platelet Count - Automated: 125 K/uL ( @ 07:13)  Platelet Count - Automated: 146 K/uL (08-15 @ 16:26)      LIVER FUNCTIONS - ( 16 Aug 2020 07:13 )  Alb: 2.6 g/dL / Pro: 6.1 g/dL / ALK PHOS: 74 U/L / ALT: 29 U/L / AST: 28 U/L / GGT: x             Urinalysis Basic - ( 15 Aug 2020 16:28 )    Color: Yellow / Appearance: Clear / S.015 / pH: x  Gluc: x / Ketone: Negative  / Bili: Negative / Urobili: 1 mg/dL   Blood: x / Protein: Negative mg/dL / Nitrite: Negative   Leuk Esterase: Negative / RBC: x / WBC x   Sq Epi: x / Non Sq Epi: x / Bacteria: x        RADIOLOGY & ADDITIONAL STUDIES:

## 2020-08-16 NOTE — CONSULT NOTE ADULT - ASSESSMENT
cont rx cont rx        WERTHEIMER MAURICE  [facility] [phone]   1929 DOA 8/15/2020 DR IRINA FLANAGAN  ALLERGY    nka    CONTACT  son Esequile [phone]                   Initial evaluation/Pulmonary Critical Care consultation requested on  2020  by Dr Paul   from Dr Norris   Patient examined chart reviewed    HOSPITAL ADMISSION   PATIENT CAME  FROM (if information available)      WERTHEIMER MAURICE  [facility] [phone]   1929 DOA 8/15/2020 DR IRINA FLANAGAN     REVIEW OF SYMPTOMS      Able to give ROS  NO     PHYSICAL EXAM    HEENT Unremarkable PERRLA atraumatic   RESP Fair air entry EXP prolonged    Harsh breath sound Resp distres mild   CARDIAC S1 S2 No S3     NO JVD    ABDOMEN SOFT BS PRESENT NOT DISTENDED No hepatosplenomegaly PEDAL EDEMA present No calf tenderness  NO rash   GENERAL Not TOXIC looking    HPI/PMH.       89yo male resident of [facility] with PMH of Dementia with behav disorder,psychosis, dysphagia,DM2,BPH, dermatitis, sent in for AMS, with worsening confusion, In ED afebrile, vital signs stable, hyponatremia ,  ADmitted for AMS likely metabolic encephalopathy due to hyponatremia   worsening Dementia with behavioural disorder past covid 19 infection  CXR revealed  atelectasis and pulm consulted 2020       OXYGENATION      2020 ra 98%    VITALS/LABS       2020 afeb 61 150/50       RELEVANT DATA  COVID igg 2020 igg pos+iv  COVID pcr 2020 Neg-iv  W  2020 w 3.8   CXR 8/15/2020 cxr rml linear opac atelect or scar   Hb 2020 Hb 10.4   Plt 2020 plt 125   Na 2020 Na 137   Cr 2020 Cr .5   CT 8/15/2020 ct h neg-iv   DEPAKOTE depakote 250.2 (8/15)   QUETIAPINE quetiapine 25.3 (8/15)     PT DATA/BEST PRACTICE  ALLERGY              nka         WT                                     BMI                            CrCl                    ADVANCED DIRECTIVE   dnr  LINES TUBES POA.              HEAD OF BED ELEVATION Yes      DVT PROPHYLAXIS.     lvnx 40 (8/15)    DYSPHAGIA EVAL .    DIET.dys 1 puree thin (8/15)                                                                     IV F.  MILLER PROPHYLAXIS.       OVERALL PLAN.    89yo male resident of [facility] with PMH of Dementia ,psychosis, dysphagia,DM2,BPH, dermatitis, sent in for AMS, with worsening confusion, hyponatremia and was confirmed serologically to have had past covid 19 infection  CXR revealed  atelectasis and pulm consulted 2020     DISPOSITION PLANNING.       ATELECTASIS   Check procalc  Check CT ch  Doubt he will cooperate with incentive pilar   Changes could be sec to previous covid   Need to exclud mass and get basseline ct ch to follow serially  Can refer to see me in pulm office  in 2 w after discharge                             TIME SPENT Over 55 minutes aggregate care time spent on encounter; activities included combinations of  direct pt care, counseling and/or coordinating care reviewing notes, lab data/ imaging, discussion with multidisciplinary team/ pt /family. Risks, benefits, alternatives of planned interventions when applicable were discussed in detail and questions answered as best as possible    WERTHEIMER MAURICE  [facility] [phone]   1929 DOA 8/15/2020 DR IRINA FLANAGAN

## 2020-08-16 NOTE — SWALLOW BEDSIDE ASSESSMENT ADULT - ASR SWALLOW ASPIRATION MONITOR
gurgly voice/pneumonia/upper respiratory infection/change of breathing pattern/position upright (89Y)/fever/throat clearing/oral hygiene/cough

## 2020-08-16 NOTE — PHYSICAL THERAPY INITIAL EVALUATION ADULT - ADDITIONAL COMMENTS
patient is a poor historian, unable to answer questions. as per EMR, patient resident at [facility]

## 2020-08-16 NOTE — SWALLOW BEDSIDE ASSESSMENT ADULT - COMMENTS
Per charting, patient is a "89yo male resident of [facility] with PMH of Dementia with behav disorder,psychosis, dysphagia,DM2,BPH, dermatitis, sent in for AMS, with worsening confusion, In ED afebrile, vital signs stable, hyponatremia, ADmitted for AMS likely metabolic encephalopathy due to hyponatremia worsening Dementia with behavioural disorder"    HEAD CT 8/15: "There is no evidence for recent intraparenchymal hemorrhage or acute territorial type infarct. No extra-axial fluid collections identified."    XR CHEST 8/15: "Right midlung linear opacity likely since atelectasis or scarring. Lungs are otherwise clear. No acute osseous finding."    Consult received and chart reviewed. The patient was seen at bedside this PM to assess swallow function, at which he was awake/alert and confused. The patient in agitated state as evidenced by yelling/grabbing throughout evaluation. The patient verbalizing in nonsensical speech and in contracted posture despite repositioning by SLP and RN. The patient did not follow simple directives.

## 2020-08-16 NOTE — CONSULT NOTE ADULT - SUBJECTIVE AND OBJECTIVE BOX
HPI:  89yo male resident of [facility] with PMH of Dementia with behav disorder,psychosis, dysphagia,DM2,BPH, dermatitis, presented to the hospiatal with CC of  AMS, with worsening confusion, In ED afebrile, vital signs stable, noted to have hyponatremia. Pt unable to provide history. No n/v/d CP SOB.     Infectious Disease consult was called to evaluate pt.      Past Medical & Surgical Hx:  PAST MEDICAL & SURGICAL HISTORY:  Dementia  BPH associated with nocturia  Benign prostatic hyperplasia, unspecified whether lower urinary tract symptoms present  Type 2 diabetes mellitus without complication, without long-term current use of insulin  Unilateral inguinal hernia with obstruction and without gangrene, recurrence not specified  H/O adenoidectomy  H/O eye surgery: &gt; 70 years ago    Social History--  EtOH: denies   Tobacco: denies   Drug Use: denies       FAMILY HISTORY:  No pertinent family history in first degree relatives      Allergies  No Known Allergies    Intolerances  NONE    Home Medications:  Alogliptin 25 mg oral tablet: 1 tab(s) orally once a day (15 Aug 2020 21:33)  divalproex sodium 250 mg oral delayed release tablet: orally 2 times a day (15 Aug 2020 21:33)  loratadine 10 mg oral tablet: 1 tab(s) orally once a day (15 Aug 2020 21:33)  Melatonin 5 mg oral tablet: 1 tab(s) orally once a day (at bedtime) (15 Aug 2020 21:33)  QUEtiapine 25 mg oral tablet: 1 tab(s) orally 3 times a day (15 Aug 2020 21:33)  Vitamin D3 5000 intl units (125 mcg) oral tablet: orally once a day (15 Aug 2020 21:33)    Current Inpatient Medications :    ANTIBIOTICS:     OTHER RELEVANT MEDICATIONS :  dextrose 40% Gel 15 Gram(s) Oral once PRN  dextrose 5%. 1000 milliLiter(s) IV Continuous <Continuous>  dextrose 50% Injectable 12.5 Gram(s) IV Push once  dextrose 50% Injectable 25 Gram(s) IV Push once  dextrose 50% Injectable 25 Gram(s) IV Push once  diVALproex  milliGRAM(s) Oral two times a day  enoxaparin Injectable 40 milliGRAM(s) SubCutaneous daily  glucagon  Injectable 1 milliGRAM(s) IntraMuscular once PRN  insulin lispro (HumaLOG) corrective regimen sliding scale   SubCutaneous three times a day before meals  melatonin 5 milliGRAM(s) Oral at bedtime  QUEtiapine 25 milliGRAM(s) Oral three times a day  sodium chloride 0.9%. 1000 milliLiter(s) IV Continuous <Continuous>      ROS:  Unable to obtain due to : dementia    I&O's Detail    16 Aug 2020 07:01  -  16 Aug 2020 19:10  --------------------------------------------------------  IN:    Oral Fluid: 495 mL  Total IN: 495 mL    OUT:    Voided: 650 mL  Total OUT: 650 mL    Total NET: -155 mL      Physical Exam:  Vital Signs Last 24 Hrs  T(C): 36.4 (16 Aug 2020 12:45), Max: 36.7 (15 Aug 2020 23:00)  T(F): 97.5 (16 Aug 2020 12:45), Max: 98.1 (16 Aug 2020 01:31)  HR: 59 (16 Aug 2020 12:45) (56 - 71)  BP: 129/64 (16 Aug 2020 12:45) (129/64 - 171/70)  RR: 18 (16 Aug 2020 12:45) (18 - 20)  SpO2: 97% (16 Aug 2020 12:45) (96% - 98%)  Height (cm): 170.18 (08-15 @ 20:46)    General:  no acute distress Confused  Eyes: sclera anicteric, pupils equal and reactive to light  ENMT: buccal mucosa moist, pharynx not injected  Neck: supple, trachea midline  Lungs: Decreased, no wheeze/rhonchi  Cardiovascular: regular rate and rhythm, S1 S2  Abdomen: soft, nontender, no organomegaly present, bowel sounds normal  Neurological:  awake confused  Skin: no increased ecchymosis/petechiae/purpura  Extremities: + contractures Trace edema    Labs:                         10.4   3.89  )-----------( 125      ( 16 Aug 2020 07:13 )             31.0   08-16    137  |  101  |  12  ----------------------------<  95  4.5   |  33<H>  |  0.56    Ca    8.1<L>      16 Aug 2020 07:13  Phos  3.2     08-  Mg     2.0     -16    TPro  6.1  /  Alb  2.6<L>  /  TBili  0.5  /  DBili  x   /  AST  28  /  ALT  29  /  AlkPhos  74  08-    Urinalysis Basic - ( 15 Aug 2020 16:28 )    Color: Yellow / Appearance: Clear / S.015 / pH: x  Gluc: x / Ketone: Negative  / Bili: Negative / Urobili: 1 mg/dL   Blood: x / Protein: Negative mg/dL / Nitrite: Negative   Leuk Esterase: Negative / RBC: x / WBC x   Sq Epi: x / Non Sq Epi: x / Bacteria: x    RECENT CULTURES:    Culture - Urine (collected 15 Aug 2020 22:25)  Source: .Urine Catheterized  Final Report (16 Aug 2020 17:38):    No growth    COVID-19  Antibody - for prior infection screening (20 @ 03:45)    COVID-19 IgG Antibody Index: 112.00: Roche ECLIA Total AB (AMEENA)  NOTE: This result index represents a total antibody measurement,  which  includes IgG, IgA, and IgM  Negative <= 0.99 Index  Positive >= 1.00 Index Index    COVID-19 IgG Antibody Interpretation: Positive: This test has not been reviewed by the FDA by the standard review  process. It has been authorized for emergency use by the FDA. [employer] has validated this test to be accurate.  Negative results do not rule out SARS-CoV-2 infection, particularly in  those who have been in recent contact with the virus. Follow-up testing  with a molecular diagnostic test should be considered to rule out  infection in these individuals.  Results from antibody testing should not be used as thesole basis to  diagnose or exclude SARS-CoV-2 infection, or to inform infection status.  Positive results may rarely be due to past or present infection with  non-SARS-CoV-2 coronavirus strains, such as coronavirus HKU1, NL63, OC43,  or 229E. [employer], through extensive validation  testing, has confirmed that this risk is minimal with this test.      RADIOLOGY & ADDITIONAL STUDIES:    EXAM:  XR CHEST AP OR PA 1V                                  PROCEDURE DATE:  08/15/2020          INTERPRETATION:  CLINICAL INFORMATION: Altered mental status.    A frontal view of the chest was obtained.    Comparison: None.    IMPRESSION:    The mediastinal cardiac silhouette is unremarkable.    Right midlung linear opacity likely since atelectasis or scarring. Lungs are otherwise clear.    No acute osseous finding.          Assessment :   89yo male resident of [facility] with PMH of Dementia with behav disorder,psychosis, dysphagia,DM2,BPH, dermatitis, presented to the hospiatal with CC of  AMS, with worsening confusion likely sec dehydration hyponatremia and progression of dementia. COVID 19 antibodies represent past exposure or infection. Stable from ID standpoint.      Plan :   Defer antibiotics  Trend temps and cbc  Fu cultures  Asp precautions  IVF and hydration per primary team    D/w Dr Brizuela    Continue with present regime .  Approptiate use of antibiotics and adverse effects reviewed.      > 45 minutes spent in direct patient care reviewing  the notes, lab data/ imaging , discussion with multidisciplinary team. All questions were addressed and answered to the best of my capacity .    Thank you for allowing me to participate in the care of your patient .      Glenn Barnes MD  Infectious Disease  [phone]

## 2020-08-16 NOTE — SWALLOW BEDSIDE ASSESSMENT ADULT - SWALLOW EVAL: RECOMMENDED FEEDING/EATING TECHNIQUES
maintain upright posture during/after eating for 30 mins/crush medication (when feasible)/small sips/bites/alternate food with liquid/check mouth frequently for oral residue/pocketing/position upright (90 degrees)

## 2020-08-16 NOTE — SWALLOW BEDSIDE ASSESSMENT ADULT - SWALLOW EVAL: DIAGNOSIS
1. The patient demonstrated functional oral management of puree, nectar thick and thin liquid textures marked by adequate bolus collection, transfer and posterior transport. 2. The patient demonstrated functional pharyngeal skills for all consistencies trialed marked by timely initiation of swallow trigger with adequate hyolaryngeal elevation upon digital palpation without evidence of laryngeal penetration. The patient with increased confusion/agitation therefore no further consistencies trialed.

## 2020-08-16 NOTE — PROGRESS NOTE ADULT - ASSESSMENT
89yo male resident of [facility] with PMH of Dementia with behav disorder,psychosis, dysphagia,DM2,BPH, dermatitis, sent in for AMS, with worsening confusion, In ED afebrile, vital signs stable, hyponatremia ,  ADmitted for AMS likely metabolic encephalopathy due to hyponatremia   worsening Dementia with behavioural disorder  covid 19 infection  DM2  atelectasis      goals of care and advanced care plan to be discussed with family  family called and message left    45 minutes spent on this visit, 50% visit time spent in care co-ordination with other attendings and counselling patient

## 2020-08-17 VITALS
DIASTOLIC BLOOD PRESSURE: 69 MMHG | OXYGEN SATURATION: 100 % | HEART RATE: 59 BPM | SYSTOLIC BLOOD PRESSURE: 163 MMHG | RESPIRATION RATE: 19 BRPM | TEMPERATURE: 98 F

## 2020-08-17 LAB
ALBUMIN SERPL ELPH-MCNC: 2.7 G/DL — LOW (ref 3.3–5)
ALP SERPL-CCNC: 74 U/L — SIGNIFICANT CHANGE UP (ref 40–120)
ALT FLD-CCNC: 29 U/L — SIGNIFICANT CHANGE UP (ref 12–78)
ANION GAP SERPL CALC-SCNC: 2 MMOL/L — LOW (ref 5–17)
AST SERPL-CCNC: 25 U/L — SIGNIFICANT CHANGE UP (ref 15–37)
BASOPHILS # BLD AUTO: 0.03 K/UL — SIGNIFICANT CHANGE UP (ref 0–0.2)
BASOPHILS NFR BLD AUTO: 0.6 % — SIGNIFICANT CHANGE UP (ref 0–2)
BILIRUB SERPL-MCNC: 0.6 MG/DL — SIGNIFICANT CHANGE UP (ref 0.2–1.2)
BUN SERPL-MCNC: 11 MG/DL — SIGNIFICANT CHANGE UP (ref 7–23)
CALCIUM SERPL-MCNC: 8.1 MG/DL — LOW (ref 8.5–10.1)
CHLORIDE SERPL-SCNC: 100 MMOL/L — SIGNIFICANT CHANGE UP (ref 96–108)
CO2 SERPL-SCNC: 34 MMOL/L — HIGH (ref 22–31)
CREAT SERPL-MCNC: 0.54 MG/DL — SIGNIFICANT CHANGE UP (ref 0.5–1.3)
EOSINOPHIL # BLD AUTO: 0.23 K/UL — SIGNIFICANT CHANGE UP (ref 0–0.5)
EOSINOPHIL NFR BLD AUTO: 4.9 % — SIGNIFICANT CHANGE UP (ref 0–6)
GLUCOSE SERPL-MCNC: 84 MG/DL — SIGNIFICANT CHANGE UP (ref 70–99)
HCT VFR BLD CALC: 33.1 % — LOW (ref 39–50)
HGB BLD-MCNC: 11.2 G/DL — LOW (ref 13–17)
IMM GRANULOCYTES NFR BLD AUTO: 0.2 % — SIGNIFICANT CHANGE UP (ref 0–1.5)
LYMPHOCYTES # BLD AUTO: 0.83 K/UL — LOW (ref 1–3.3)
LYMPHOCYTES # BLD AUTO: 17.8 % — SIGNIFICANT CHANGE UP (ref 13–44)
MCHC RBC-ENTMCNC: 29.1 PG — SIGNIFICANT CHANGE UP (ref 27–34)
MCHC RBC-ENTMCNC: 33.8 GM/DL — SIGNIFICANT CHANGE UP (ref 32–36)
MCV RBC AUTO: 86 FL — SIGNIFICANT CHANGE UP (ref 80–100)
MONOCYTES # BLD AUTO: 0.38 K/UL — SIGNIFICANT CHANGE UP (ref 0–0.9)
MONOCYTES NFR BLD AUTO: 8.2 % — SIGNIFICANT CHANGE UP (ref 2–14)
NEUTROPHILS # BLD AUTO: 3.18 K/UL — SIGNIFICANT CHANGE UP (ref 1.8–7.4)
NEUTROPHILS NFR BLD AUTO: 68.3 % — SIGNIFICANT CHANGE UP (ref 43–77)
NRBC # BLD: 0 /100 WBCS — SIGNIFICANT CHANGE UP (ref 0–0)
PLATELET # BLD AUTO: 155 K/UL — SIGNIFICANT CHANGE UP (ref 150–400)
POTASSIUM SERPL-MCNC: 4.4 MMOL/L — SIGNIFICANT CHANGE UP (ref 3.5–5.3)
POTASSIUM SERPL-SCNC: 4.4 MMOL/L — SIGNIFICANT CHANGE UP (ref 3.5–5.3)
PROCALCITONIN SERPL-MCNC: <0.05 — SIGNIFICANT CHANGE UP (ref 0–0.04)
PROT SERPL-MCNC: 6.4 G/DL — SIGNIFICANT CHANGE UP (ref 6–8.3)
RBC # BLD: 3.85 M/UL — LOW (ref 4.2–5.8)
RBC # FLD: 13.8 % — SIGNIFICANT CHANGE UP (ref 10.3–14.5)
SODIUM SERPL-SCNC: 136 MMOL/L — SIGNIFICANT CHANGE UP (ref 135–145)
WBC # BLD: 4.66 K/UL — SIGNIFICANT CHANGE UP (ref 3.8–10.5)
WBC # FLD AUTO: 4.66 K/UL — SIGNIFICANT CHANGE UP (ref 3.8–10.5)

## 2020-08-17 PROCEDURE — 86769 SARS-COV-2 COVID-19 ANTIBODY: CPT

## 2020-08-17 PROCEDURE — 84145 PROCALCITONIN (PCT): CPT

## 2020-08-17 PROCEDURE — 71250 CT THORAX DX C-: CPT

## 2020-08-17 PROCEDURE — 84134 ASSAY OF PREALBUMIN: CPT

## 2020-08-17 PROCEDURE — 83735 ASSAY OF MAGNESIUM: CPT

## 2020-08-17 PROCEDURE — 84480 ASSAY TRIIODOTHYRONINE (T3): CPT

## 2020-08-17 PROCEDURE — 92610 EVALUATE SWALLOWING FUNCTION: CPT

## 2020-08-17 PROCEDURE — 85027 COMPLETE CBC AUTOMATED: CPT

## 2020-08-17 PROCEDURE — 84100 ASSAY OF PHOSPHORUS: CPT

## 2020-08-17 PROCEDURE — 99285 EMERGENCY DEPT VISIT HI MDM: CPT | Mod: 25

## 2020-08-17 PROCEDURE — 80053 COMPREHEN METABOLIC PANEL: CPT

## 2020-08-17 PROCEDURE — 36415 COLL VENOUS BLD VENIPUNCTURE: CPT

## 2020-08-17 PROCEDURE — 84443 ASSAY THYROID STIM HORMONE: CPT

## 2020-08-17 PROCEDURE — 80061 LIPID PANEL: CPT

## 2020-08-17 PROCEDURE — 84436 ASSAY OF TOTAL THYROXINE: CPT

## 2020-08-17 PROCEDURE — 87635 SARS-COV-2 COVID-19 AMP PRB: CPT

## 2020-08-17 PROCEDURE — 83036 HEMOGLOBIN GLYCOSYLATED A1C: CPT

## 2020-08-17 PROCEDURE — 97163 PT EVAL HIGH COMPLEX 45 MIN: CPT

## 2020-08-17 PROCEDURE — 82962 GLUCOSE BLOOD TEST: CPT

## 2020-08-17 RX ADMIN — QUETIAPINE FUMARATE 25 MILLIGRAM(S): 200 TABLET, FILM COATED ORAL at 05:12

## 2020-08-17 RX ADMIN — QUETIAPINE FUMARATE 25 MILLIGRAM(S): 200 TABLET, FILM COATED ORAL at 14:51

## 2020-08-17 RX ADMIN — ENOXAPARIN SODIUM 40 MILLIGRAM(S): 100 INJECTION SUBCUTANEOUS at 14:51

## 2020-08-17 RX ADMIN — DIVALPROEX SODIUM 250 MILLIGRAM(S): 500 TABLET, DELAYED RELEASE ORAL at 05:12

## 2020-08-17 NOTE — DISCHARGE NOTE NURSING/CASE MANAGEMENT/SOCIAL WORK - NSDCCRNAME_GEN_ALL_CORE_FT
An ambulance has been arranged to transport you home to [facility]  - scheduled for 3pm

## 2020-08-17 NOTE — DISCHARGE NOTE NURSING/CASE MANAGEMENT/SOCIAL WORK - PATIENT PORTAL LINK FT
You can access the FollowMyHealth Patient Portal offered by [facility] by registering at the following website: [email or web address]. By joining [employer]’s FollowMyHealth portal, you will also be able to view your health information using other applications (apps) compatible with our system.

## 2020-08-17 NOTE — DISCHARGE NOTE PROVIDER - HOSPITAL COURSE
date of service 8/17/2020    89yo male resident of [facility] with PMH of Dementia with behav disorder,psychosis, dysphagia,DM2,BPH, dermatitis, sent in for AMS, with worsening confusion, In ED afebrile, vital signs stable, hyponatremia ,    ADmitted for AMS likely metabolic encephalopathy due to hyponatremia     worsening Dementia with behavioural disorder    hyponatremia resolved    covid 19 infection    DM2     mild malnutrition with dementia    atelectasis        80 minutes spent on this visit, 50% visit time spent in care co-ordination with other attendings and counselling patient    Advanced care planning discussed with Son  Advaced care planning forms reviewed,discussed /completed, 20 min spent

## 2020-08-17 NOTE — PROGRESS NOTE ADULT - PROBLEM SELECTOR PLAN 1
supportive care, correct electrolytes, neuro consult
supportive care, correct electrolytes, neuro consult

## 2020-08-17 NOTE — PROGRESS NOTE ADULT - ASSESSMENT
cont rx cont rx      WERTHEIMER KACIE  [facility] [phone]   1929 DOA 8/15/2020 DR IRINA FLANAGAN     REVIEW OF SYMPTOMS      Able to give ROS  NO     PHYSICAL EXAM    HEENT Unremarkable PERRLA atraumatic   RESP Fair air entry EXP prolonged    Harsh breath sound Resp distres mild   CARDIAC S1 S2 No S3     NO JVD    ABDOMEN SOFT BS PRESENT NOT DISTENDED No hepatosplenomegaly PEDAL EDEMA present No calf tenderness  NO rash   GENERAL Not TOXIC looking      OXYGENATION      2020 ra 98%    VITALS/LABS     2020 afeb 60 130/50         OVERALL PLAN.    89yo male resident of [facility] with PMH of Dementia ,psychosis, dysphagia,DM2,BPH, dermatitis, sent in for AMS, with worsening confusion, hyponatremia and was confirmed serologically to have had past covid 19 infection  CXR revealed  atelectasis and pulm consulted 2020     DISPOSITION PLANNING.       ATELECTASIS   Procalc lo   CT ch sm effsn r larger  Doubt he will cooperate with incentive pilar   Changes could be sec to previous covid   Need to exclud mass and get basseline ct ch to follow serially  Can refer to see me in pulm office  in 2 w after discharge                             TIME SPENT Over 25 minutes aggregate care time spent on encounter; activities included combinations of  direct pt care, counseling and/or coordinating care reviewing notes, lab data/ imaging, discussion with multidisciplinary team/ pt /family. Risks, benefits, alternatives of planned interventions when applicable were discussed in detail and questions answered as best as possible    WERTHEIMER MAURICE  [facility] [phone]   1929 DOA 8/15/2020 DR IRINA FLANAGAN

## 2020-08-17 NOTE — PROGRESS NOTE ADULT - ASSESSMENT
89yo male resident of [facility] with PMH of Dementia with behav disorder,psychosis, dysphagia,DM2,BPH, dermatitis, sent in for AMS, with worsening confusion, In ED afebrile, vital signs stable, hyponatremia ,  ADmitted for AMS likely metabolic encephalopathy due to hyponatremia   worsening Dementia with behavioural disorder  hyponatremia resolved  covid 19 infection  DM2  atelectasis  Dc plan      goals of care and advanced care plan to be discussed with family  family called and message left    45 minutes spent on this visit, 50% visit time spent in care co-ordination with other attendings and counselling patient

## 2020-08-17 NOTE — DISCHARGE NOTE PROVIDER - CARE PROVIDER_API CALL
Geetha Lainez  INTERNAL MEDICINE  [address]  [place] [address]  Phone: [phone]  Fax: [phone]  Follow Up Time:

## 2020-08-17 NOTE — PROGRESS NOTE ADULT - SUBJECTIVE AND OBJECTIVE BOX
Patient is a 89y old  Male who presents with a chief complaint of AMS (17 Aug 2020 09:59)      INTERVAL HPI/OVERNIGHT EVENTS:overnight events noted    Home Medications:  Alogliptin 25 mg oral tablet: 1 tab(s) orally once a day (15 Aug 2020 21:33)  divalproex sodium 250 mg oral delayed release tablet: orally 2 times a day (15 Aug 2020 21:33)  loratadine 10 mg oral tablet: 1 tab(s) orally once a day (15 Aug 2020 21:33)  Melatonin 5 mg oral tablet: 1 tab(s) orally once a day (at bedtime) (15 Aug 2020 21:33)  QUEtiapine 25 mg oral tablet: 1 tab(s) orally 3 times a day (15 Aug 2020 21:33)  Vitamin D3 5000 intl units (125 mcg) oral tablet: orally once a day (15 Aug 2020 21:33)      MEDICATIONS  (STANDING):  dextrose 5%. 1000 milliLiter(s) (50 mL/Hr) IV Continuous <Continuous>  dextrose 50% Injectable 12.5 Gram(s) IV Push once  dextrose 50% Injectable 25 Gram(s) IV Push once  dextrose 50% Injectable 25 Gram(s) IV Push once  diVALproex  milliGRAM(s) Oral two times a day  enoxaparin Injectable 40 milliGRAM(s) SubCutaneous daily  insulin lispro (HumaLOG) corrective regimen sliding scale   SubCutaneous three times a day before meals  melatonin 5 milliGRAM(s) Oral at bedtime  QUEtiapine 25 milliGRAM(s) Oral three times a day  sodium chloride 0.9%. 1000 milliLiter(s) (75 mL/Hr) IV Continuous <Continuous>    MEDICATIONS  (PRN):  dextrose 40% Gel 15 Gram(s) Oral once PRN Blood Glucose LESS THAN 70 milliGRAM(s)/deciliter  glucagon  Injectable 1 milliGRAM(s) IntraMuscular once PRN Glucose LESS THAN 70 milligrams/deciliter      Allergies    No Known Allergies    Intolerances        REVIEW OF SYSTEMS:unable as confused  Vital Signs Last 24 Hrs  T(C): 36.4 (17 Aug 2020 05:04), Max: 36.4 (16 Aug 2020 12:45)  T(F): 97.5 (17 Aug 2020 05:04), Max: 97.6 (16 Aug 2020 21:59)  HR: 59 (17 Aug 2020 05:04) (59 - 64)  BP: 163/69 (17 Aug 2020 05:04) (129/64 - 163/69)  BP(mean): --  RR: 19 (17 Aug 2020 05:04) (18 - 20)  SpO2: 100% (17 Aug 2020 05:04) (94% - 100%)    PHYSICAL EXAM:  GENERAL: , well-groomed, well-developed  HEAD:  Atraumatic, Normocephalic  EYES: EOMI, PERRLA, conjunctiva and sclera clear  ENMT: Moist mucous membranes,   NECK: Supple, No JVD, Normal thyroid  NERVOUS SYSTEM:  Alert & confused unable to ambulate  CHEST/LUNG: Clear to percussion bilaterally; No rales, rhonchi, wheezing, or rubs  HEART: Regular rate and rhythm; No murmurs, rubs, or gallops  ABDOMEN: Soft, Nontender, Nondistended; Bowel sounds present  EXTREMITIES:  2+ Peripheral Pulses, No clubbing, cyanosis, or edema  SKIN: No rashes or lesions    LABS:                        11.2   4.66  )-----------( 155      ( 17 Aug 2020 06:42 )             33.1     08-17    136  |  100  |  11  ----------------------------<  84  4.4   |  34<H>  |  0.54    Ca    8.1<L>      17 Aug 2020 06:42  Phos  3.2     08-16  Mg     2.0     08-16    TPro  6.4  /  Alb  2.7<L>  /  TBili  0.6  /  DBili  x   /  AST  25  /  ALT  29  /  AlkPhos  74  08-17      Urinalysis Basic - ( 15 Aug 2020 16:28 )    Color: Yellow / Appearance: Clear / S.015 / pH: x  Gluc: x / Ketone: Negative  / Bili: Negative / Urobili: 1 mg/dL   Blood: x / Protein: Negative mg/dL / Nitrite: Negative   Leuk Esterase: Negative / RBC: x / WBC x   Sq Epi: x / Non Sq Epi: x / Bacteria: x      CAPILLARY BLOOD GLUCOSE      POCT Blood Glucose.: 91 mg/dL (17 Aug 2020 07:57)  POCT Blood Glucose.: 102 mg/dL (16 Aug 2020 21:48)  POCT Blood Glucose.: 114 mg/dL (16 Aug 2020 11:46)        Culture - Blood (collected 08-15-20 @ 22:41)  Source: .Blood Blood  Preliminary Report (20 @ 23:01):    No growth to date.    Culture - Blood (collected 08-15-20 @ 22:41)  Source: .Blood Blood  Preliminary Report (20 @ 23:01):    No growth to date.    Culture - Urine (collected 08-15-20 @ 22:25)  Source: .Urine Catheterized  Final Report (20 @ 17:38):    No growth        I&O's Summary    16 Aug 2020 07:01  -  17 Aug 2020 07:00  --------------------------------------------------------  IN: 1320 mL / OUT: 650 mL / NET: 670 mL        RADIOLOGY & ADDITIONAL TESTS:    Imaging Personally Reviewed:  [ x] YES  [ ] NO    Consultant(s) Notes Reviewed:  [x ] YES  [ ] NO    Care Discussed with Consultants/Other Providers [x ] YES  [ ] NO
MAURICE WERTHEIMER    PLV [address]    Patient is a 89y old  Male who presents with a chief complaint of AMS (16 Aug 2020 18:10)       Allergies    No Known Allergies    Intolerances        HPI:  89[facility] male resident of [facility] with PMH of Dementia with behav disorder,psychosis, dysphagia,DM2,BPH, dermatitis, sent in for AMS, with worsening confusion, In ED afebrile, vital signs stable, hyponatremia ,  ADmitted for AMS likely metabolic encephalopathy due to hyponatremia   worsening Dementia with behavioural disorder (15 Aug 2020 22:14)      PAST MEDICAL & SURGICAL HISTORY:  Dementia  BPH associated with nocturia  Benign prostatic hyperplasia, unspecified whether lower urinary tract symptoms present  Type 2 diabetes mellitus without complication, without long-term current use of insulin  Unilateral inguinal hernia with obstruction and without gangrene, recurrence not specified  H/O adenoidectomy  H/O eye surgery: &gt; 70 years ago      FAMILY HISTORY:  No pertinent family history in first degree relatives        MEDICATIONS   dextrose 40% Gel 15 Gram(s) Oral once PRN  dextrose 5%. 1000 milliLiter(s) IV Continuous <Continuous>  dextrose 50% Injectable 12.5 Gram(s) IV Push once  dextrose 50% Injectable 25 Gram(s) IV Push once  dextrose 50% Injectable 25 Gram(s) IV Push once  diVALproex  milliGRAM(s) Oral two times a day  enoxaparin Injectable 40 milliGRAM(s) SubCutaneous daily  glucagon  Injectable 1 milliGRAM(s) IntraMuscular once PRN  insulin lispro (HumaLOG) corrective regimen sliding scale   SubCutaneous three times a day before meals  melatonin 5 milliGRAM(s) Oral at bedtime  QUEtiapine 25 milliGRAM(s) Oral three times a day  sodium chloride 0.9%. 1000 milliLiter(s) IV Continuous <Continuous>      Vital Signs Last 24 Hrs  T(C): 36.4 (17 Aug 2020 05:04), Max: 36.4 (16 Aug 2020 12:45)  T(F): 97.5 (17 Aug 2020 05:04), Max: 97.6 (16 Aug 2020 21:59)  HR: 59 (17 Aug 2020 05:04) (59 - 64)  BP: 163/69 (17 Aug 2020 05:04) (129/64 - 163/69)  BP(mean): --  RR: 19 (17 Aug 2020 05:04) (18 - 20)  SpO2: 100% (17 Aug 2020 05:04) (94% - 100%)      20 @ 07:01  -  20 @ 07:00  --------------------------------------------------------  IN: 1320 mL / OUT: 650 mL / NET: 670 mL            LABS:                        11.2   4.66  )-----------( 155      ( 17 Aug 2020 06:42 )             33.1         136  |  100  |  11  ----------------------------<  84  4.4   |  34<H>  |  0.54    Ca    8.1<L>      17 Aug 2020 06:42  Phos  3.2       Mg     2.0         TPro  6.4  /  Alb  2.7<L>  /  TBili  0.6  /  DBili  x   /  AST  25  /  ALT  29  /  AlkPhos  74        Urinalysis Basic - ( 15 Aug 2020 16:28 )    Color: Yellow / Appearance: Clear / S.015 / pH: x  Gluc: x / Ketone: Negative  / Bili: Negative / Urobili: 1 mg/dL   Blood: x / Protein: Negative mg/dL / Nitrite: Negative   Leuk Esterase: Negative / RBC: x / WBC x   Sq Epi: x / Non Sq Epi: x / Bacteria: x            WBC:  WBC Count: 4.66 K/uL ( @ 06:42)  WBC Count: 3.89 K/uL ( @ 07:13)  WBC Count: 4.48 K/uL (08-15 @ 16:26)      MICROBIOLOGY:  RECENT CULTURES:  08-15 .Blood Blood XXXX XXXX   No growth to date.    08-15 .Urine Catheterized XXXX XXXX   No growth          CARDIAC MARKERS ( 15 Aug 2020 16:26 )  .000 ng/mL / x     / x     / x     / x                Sodium:  Sodium, Serum: 136 mmol/L ( @ 06:42)  Sodium, Serum: 137 mmol/L ( @ 07:13)  Sodium, Serum: 129 mmol/L (08-15 @ 16:26)      0.54 mg/dL  06:42  0.56 mg/dL  07:13  0.80 mg/dL 08-15 @ 16:26      Hemoglobin:  Hemoglobin: 11.2 g/dL ( @ 06:42)  Hemoglobin: 10.4 g/dL ( @ 07:13)  Hemoglobin: 11.2 g/dL (08-15 @ 16:26)      Platelets: Platelet Count - Automated: 155 K/uL ( 06:42)  Platelet Count - Automated: 125 K/uL ( 07:13)  Platelet Count - Automated: 146 K/uL (08-15 @ 16:26)      LIVER FUNCTIONS - ( 17 Aug 2020 06:42 )  Alb: 2.7 g/dL / Pro: 6.4 g/dL / ALK PHOS: 74 U/L / ALT: 29 U/L / AST: 25 U/L / GGT: x             Urinalysis Basic - ( 15 Aug 2020 16:28 )    Color: Yellow / Appearance: Clear / S.015 / pH: x  Gluc: x / Ketone: Negative  / Bili: Negative / Urobili: 1 mg/dL   Blood: x / Protein: Negative mg/dL / Nitrite: Negative   Leuk Esterase: Negative / RBC: x / WBC x   Sq Epi: x / Non Sq Epi: x / Bacteria: x        RADIOLOGY & ADDITIONAL STUDIES:
Patient is a 89y old  Male who presents with a chief complaint of AMS (15 Aug 2020 22:14)      INTERVAL HPI/OVERNIGHT EVENTS:overnight events noted    Home Medications:  Alogliptin 25 mg oral tablet: 1 tab(s) orally once a day (15 Aug 2020 21:33)  divalproex sodium 250 mg oral delayed release tablet: orally 2 times a day (15 Aug 2020 21:33)  loratadine 10 mg oral tablet: 1 tab(s) orally once a day (15 Aug 2020 21:33)  Melatonin 5 mg oral tablet: 1 tab(s) orally once a day (at bedtime) (15 Aug 2020 21:33)  QUEtiapine 25 mg oral tablet: 1 tab(s) orally 3 times a day (15 Aug 2020 21:33)  Vitamin D3 5000 intl units (125 mcg) oral tablet: orally once a day (15 Aug 2020 21:33)      MEDICATIONS  (STANDING):  dextrose 5%. 1000 milliLiter(s) (50 mL/Hr) IV Continuous <Continuous>  dextrose 50% Injectable 12.5 Gram(s) IV Push once  dextrose 50% Injectable 25 Gram(s) IV Push once  dextrose 50% Injectable 25 Gram(s) IV Push once  diVALproex  milliGRAM(s) Oral two times a day  enoxaparin Injectable 40 milliGRAM(s) SubCutaneous daily  insulin lispro (HumaLOG) corrective regimen sliding scale   SubCutaneous three times a day before meals  melatonin 5 milliGRAM(s) Oral at bedtime  QUEtiapine 25 milliGRAM(s) Oral three times a day  sodium chloride 0.9%. 1000 milliLiter(s) (75 mL/Hr) IV Continuous <Continuous>    MEDICATIONS  (PRN):  dextrose 40% Gel 15 Gram(s) Oral once PRN Blood Glucose LESS THAN 70 milliGRAM(s)/deciliter  glucagon  Injectable 1 milliGRAM(s) IntraMuscular once PRN Glucose LESS THAN 70 milligrams/deciliter      Allergies    No Known Allergies    Intolerances        REVIEW OF SYSTEMS:unable as confused  Vital Signs Last 24 Hrs  T(C): 36.5 (16 Aug 2020 06:32), Max: 36.7 (15 Aug 2020 23:00)  T(F): 97.7 (16 Aug 2020 06:32), Max: 98.1 (16 Aug 2020 01:31)  HR: 61 (16 Aug 2020 06:32) (56 - 71)  BP: 156/54 (16 Aug 2020 06:32) (144/67 - 171/70)  BP(mean): --  RR: 18 (16 Aug 2020 06:32) (18 - 20)  SpO2: 98% (16 Aug 2020 06:32) (96% - 100%)    PHYSICAL EXAM:  GENERAL: , well-groomed, well-developed  HEAD:  Atraumatic, Normocephalic  EYES: EOMI, PERRLA, conjunctiva and sclera clear  ENMT: Moist mucous membranes,   NECK: Supple, No JVD, Normal thyroid  NERVOUS SYSTEM:  Alert &confused , not ambulating  CHEST/LUNG: Clear to percussion bilaterally; No rales, rhonchi, wheezing, or rubs  HEART: Regular rate and rhythm; No murmurs, rubs, or gallops  ABDOMEN: Soft, Nontender, Nondistended; Bowel sounds present  EXTREMITIES:  2+ Peripheral Pulses, No clubbing, cyanosis, or edema  SKIN: No rashes or lesions    LABS:                        10.4   3.89  )-----------( 125      ( 16 Aug 2020 07:13 )             31.0     08-16    137  |  101  |  12  ----------------------------<  95  4.5   |  33<H>  |  0.56    Ca    8.1<L>      16 Aug 2020 07:13  Phos  3.2     08-16  Mg     2.0     08-16    TPro  6.1  /  Alb  2.6<L>  /  TBili  0.5  /  DBili  x   /  AST  28  /  ALT  29  /  AlkPhos  74  08-16      Urinalysis Basic - ( 15 Aug 2020 16:28 )    Color: Yellow / Appearance: Clear / S.015 / pH: x  Gluc: x / Ketone: Negative  / Bili: Negative / Urobili: 1 mg/dL   Blood: x / Protein: Negative mg/dL / Nitrite: Negative   Leuk Esterase: Negative / RBC: x / WBC x   Sq Epi: x / Non Sq Epi: x / Bacteria: x      CAPILLARY BLOOD GLUCOSE      POCT Blood Glucose.: 107 mg/dL (16 Aug 2020 08:09)  POCT Blood Glucose.: 161 mg/dL (15 Aug 2020 23:12)  POCT Blood Glucose.: 145 mg/dL (15 Aug 2020 15:41)          I&O's Summary      RADIOLOGY & ADDITIONAL TESTS:    Imaging Personally Reviewed:  [ x] YES  [ ] NO    Consultant(s) Notes Reviewed:  [ x] YES  [ ] NO    Care Discussed with Consultants/Other Providers [x ] YES  [ ] NO
WERTHEIMER, MAURICE is a 89yMale , patient examined and chart reviewed.    INTERVAL HPI/ OVERNIGHT EVENTS:   No events. Poorly responsive.  Afebrile.    PAST MEDICAL & SURGICAL HISTORY:  Dementia  BPH associated with nocturia  Benign prostatic hyperplasia, unspecified whether lower urinary tract symptoms present  Type 2 diabetes mellitus without complication, without long-term current use of insulin  Unilateral inguinal hernia with obstruction and without gangrene, recurrence not specified  H/O adenoidectomy  H/O eye surgery: &gt; 70 years ago      For details regarding the patient's social history, family history, and other miscellaneous elements, please refer the initial infectious diseases consultation and/or the admitting history and physical examination for this admission.    ROS:  Unable to obtain due to : Pt's condition    Current inpatient medications :    ANTIBIOTICS/RELEVANT:    dextrose 40% Gel 15 Gram(s) Oral once PRN  dextrose 5%. 1000 milliLiter(s) IV Continuous <Continuous>  dextrose 50% Injectable 12.5 Gram(s) IV Push once  dextrose 50% Injectable 25 Gram(s) IV Push once  dextrose 50% Injectable 25 Gram(s) IV Push once  diVALproex  milliGRAM(s) Oral two times a day  enoxaparin Injectable 40 milliGRAM(s) SubCutaneous daily  glucagon  Injectable 1 milliGRAM(s) IntraMuscular once PRN  melatonin 5 milliGRAM(s) Oral at bedtime  QUEtiapine 25 milliGRAM(s) Oral three times a day  sodium chloride 0.9%. 1000 milliLiter(s) IV Continuous <Continuous>      Objective:     @ : @ 07:00  --------------------------------------------------------  IN: 1320 mL / OUT: 650 mL / NET: 670 mL     @ : @ 13:45  --------------------------------------------------------  IN: 160 mL / OUT: 0 mL / NET: 160 mL      T(C): 36.4 (20 @ 05:04), Max: 36.4 (20 @ 21:59)  HR: 59 (20 @ 05:04) (59 - 64)  BP: 163/69 (20 @ 05:04) (132/57 - 163/69)  RR: 19 (20 @ 05:04) (19 - 20)  SpO2: 100% (20 @ 05:04) (94% - 100%)      Physical Exam:  General: no acute distress Weak poorly responsive  Eyes: sclera anicteric, pupils equal and reactive to light  ENMT: buccal mucosa moist, pharynx not injected  Neck: supple, trachea midline  Lungs: Decreased no wheeze/rhonchi  Cardiovascular: regular rate and rhythm, S1 S2  Abdomen: soft, nontender, no organomegaly present, bowel sounds normal  Neurological: Poorly responsive  Skin: no increased ecchymosis/petechiae/purpura  Lymph Nodes: no palpable cervical/supraclavicular lymph nodes enlargements  Extremities: Contractures      LABS:                        11.2   4.66  )-----------( 155      ( 17 Aug 2020 06:42 )             33.1       08-17    136  |  100  |  11  ----------------------------<  84  4.4   |  34<H>  |  0.54    Ca    8.1<L>      17 Aug 2020 06:42  Phos  3.2     08-16  Mg     2.0     08-16    TPro  6.4  /  Alb  2.7<L>  /  TBili  0.6  /  DBili  x   /  AST  25  /  ALT  29  /  AlkPhos  74  08-17      Urinalysis Basic - ( 15 Aug 2020 16:28 )    Color: Yellow / Appearance: Clear / S.015 / pH: x  Gluc: x / Ketone: Negative  / Bili: Negative / Urobili: 1 mg/dL   Blood: x / Protein: Negative mg/dL / Nitrite: Negative   Leuk Esterase: Negative / RBC: x / WBC x   Sq Epi: x / Non Sq Epi: x / Bacteria: x      MICROBIOLOGY:  Culture - Blood (collected 15 Aug 2020 22:41)  Source: .Blood Blood  Preliminary Report (16 Aug 2020 23:01):    No growth to date.    Culture - Blood (collected 15 Aug 2020 22:41)  Source: .Blood Blood  Preliminary Report (16 Aug 2020 23:01):    No growth to date.    Culture - Urine (collected 15 Aug 2020 22:25)  Source: .Urine Catheterized  Final Report (16 Aug 2020 17:38):    No growth    COVID-19  Antibody - for prior infection screening (20 @ 03:45)    COVID-19 IgG Antibody Index: 112.00: Roche ECLIA Total AB (AMEENA)  NOTE: This result index represents a total antibody measurement,  which  includes IgG, IgA, and IgM  Negative <= 0.99 Index  Positive >= 1.00 Index Index    COVID-19 IgG Antibody Interpretation: Positive: This test has not been reviewed by the FDA by the standard review  process. It has been authorized for emergency use by the FDA. [facility] has validated this test to be accurate.  Negative results do not rule out SARS-CoV-2 infection, particularly in  those who have been in recent contact with the virus. Follow-up testing  with a molecular diagnostic test should be considered to rule out  infection in these individuals.  Results from antibody testing should not be used as thesole basis to  diagnose or exclude SARS-CoV-2 infection, or to inform infection status.  Positive results may rarely be due to past or present infection with  non-SARS-CoV-2 coronavirus strains, such as coronavirus HKU1, NL63, OC43,  or 229E. [facility], through extensive validation  testing, has confirmed that this risk is minimal with this test.      RADIOLOGY & ADDITIONAL STUDIES:    EXAM:  XR CHEST AP OR PA 1V                                  PROCEDURE DATE:  08/15/2020          INTERPRETATION:  CLINICAL INFORMATION: Altered mental status.    A frontal view of the chest was obtained.    Comparison: None.    IMPRESSION:    The mediastinal cardiac silhouette is unremarkable.    Right midlung linear opacity likely since atelectasis or scarring. Lungs are otherwise clear.    No acute osseous finding.          Assessment :   89yo male resident of [facility] with PMH of Dementia with behav disorder,psychosis, dysphagia,DM2,BPH, dermatitis, presented to the hospiatal with CC of  AMS, with worsening confusion likely sec dehydration hyponatremia and progression of dementia. COVID 19 antibodies represent past exposure or infection. Stable from ID standpoint.      Plan :   Defer antibiotics  Trend temps and cbc  Fu cultures  Asp precautions  IVF and hydration per primary team    D/w Dr Brizuela      Continue with present regiment.  Appropriate use of antibiotics and adverse effects reviewed.    > 35 minutes were spent in direct patient care reviewing notes, medications ,labs data/ imaging , discussion with multidisciplinary team.    Thank you for allowing me to participate in care of your patient .    Glenn Barnes MD  Infectious Disease  [phone]

## 2020-08-17 NOTE — DISCHARGE NOTE NURSING/CASE MANAGEMENT/SOCIAL WORK - NSDCCRNUMBER_GEN_ALL_CORE_FT
Discharge documentation and 3122 form has been discussed with CM at [facility]; documentation has been faxed

## 2020-08-17 NOTE — DISCHARGE NOTE PROVIDER - NSDCMRMEDTOKEN_GEN_ALL_CORE_FT
Alogliptin 25 mg oral tablet: 1 tab(s) orally once a day  divalproex sodium 250 mg oral delayed release tablet: orally 2 times a day  loratadine 10 mg oral tablet: 1 tab(s) orally once a day  Melatonin 5 mg oral tablet: 1 tab(s) orally once a day (at bedtime)  QUEtiapine 25 mg oral tablet: 1 tab(s) orally 3 times a day  Vitamin D3 5000 intl units (125 mcg) oral tablet: orally once a day

## 2020-08-20 LAB
CULTURE RESULTS: SIGNIFICANT CHANGE UP
CULTURE RESULTS: SIGNIFICANT CHANGE UP
SPECIMEN SOURCE: SIGNIFICANT CHANGE UP
SPECIMEN SOURCE: SIGNIFICANT CHANGE UP

## 2020-10-15 ENCOUNTER — EMERGENCY (EMERGENCY)
Facility: HOSPITAL | Age: 85
LOS: 1 days | Discharge: NURSING, REHAB OR LONG-TERM CARE | End: 2020-10-15
Attending: EMERGENCY MEDICINE | Admitting: EMERGENCY MEDICINE
Payer: MEDICARE

## 2020-10-15 VITALS
WEIGHT: 149.91 LBS | DIASTOLIC BLOOD PRESSURE: 68 MMHG | SYSTOLIC BLOOD PRESSURE: 142 MMHG | OXYGEN SATURATION: 97 % | HEIGHT: 67 IN | HEART RATE: 74 BPM | RESPIRATION RATE: 20 BRPM | TEMPERATURE: 98 F

## 2020-10-15 VITALS
DIASTOLIC BLOOD PRESSURE: 70 MMHG | RESPIRATION RATE: 18 BRPM | TEMPERATURE: 98 F | HEART RATE: 67 BPM | SYSTOLIC BLOOD PRESSURE: 129 MMHG | OXYGEN SATURATION: 97 %

## 2020-10-15 DIAGNOSIS — Z90.89 ACQUIRED ABSENCE OF OTHER ORGANS: Chronic | ICD-10-CM

## 2020-10-15 DIAGNOSIS — Z98.890 OTHER SPECIFIED POSTPROCEDURAL STATES: Chronic | ICD-10-CM

## 2020-10-15 LAB — SARS-COV-2 RNA SPEC QL NAA+PROBE: SIGNIFICANT CHANGE UP

## 2020-10-15 PROCEDURE — 70450 CT HEAD/BRAIN W/O DYE: CPT

## 2020-10-15 PROCEDURE — U0003: CPT

## 2020-10-15 PROCEDURE — 99284 EMERGENCY DEPT VISIT MOD MDM: CPT | Mod: 25

## 2020-10-15 PROCEDURE — 70450 CT HEAD/BRAIN W/O DYE: CPT | Mod: 26

## 2020-10-15 PROCEDURE — 99284 EMERGENCY DEPT VISIT MOD MDM: CPT

## 2020-10-15 RX ORDER — CHOLECALCIFEROL (VITAMIN D3) 125 MCG
0 CAPSULE ORAL
Qty: 0 | Refills: 0 | DISCHARGE

## 2020-10-15 RX ORDER — NYSTATIN CREAM 100000 [USP'U]/G
1 CREAM TOPICAL
Qty: 0 | Refills: 0 | DISCHARGE

## 2020-10-15 RX ORDER — LORATADINE 10 MG/1
1 TABLET ORAL
Qty: 0 | Refills: 0 | DISCHARGE

## 2020-10-15 RX ORDER — ALOGLIPTIN 12.5 MG/1
1 TABLET, FILM COATED ORAL
Qty: 0 | Refills: 0 | DISCHARGE

## 2020-10-15 RX ORDER — QUETIAPINE FUMARATE 200 MG/1
1 TABLET, FILM COATED ORAL
Qty: 0 | Refills: 0 | DISCHARGE

## 2020-10-15 RX ORDER — LANOLIN ALCOHOL/MO/W.PET/CERES
1 CREAM (GRAM) TOPICAL
Qty: 0 | Refills: 0 | DISCHARGE

## 2020-10-15 RX ORDER — DIVALPROEX SODIUM 500 MG/1
0 TABLET, DELAYED RELEASE ORAL
Qty: 0 | Refills: 0 | DISCHARGE

## 2020-10-15 NOTE — ED ADULT NURSE REASSESSMENT NOTE - NS ED NURSE REASSESS COMMENT FT1
RN spoke with Iman,  of [facility] nursing supervisor and advised staff that the pt does not need a COVID swab result from our ER to be D/C and transferred back to their facility.

## 2020-10-15 NOTE — ED PROVIDER NOTE - CLINICAL SUMMARY MEDICAL DECISION MAKING FREE TEXT BOX
Dementia pt, fell at assisted living, presents with contusion to forehead, plan CT brain, COVID swab, if negative will return to [facility]

## 2020-10-15 NOTE — ED PROVIDER NOTE - OBJECTIVE STATEMENT
89 year old male with a PMHx of Dementia, DM BIBA from [facility] for fall x today. Per EMS, pt had a witnessed fall out of his wheel chair today. Pt presents with a contusion on his forehead. Per EMS, pt is currently at his baseline mental status. Pt is a poor historian, unable to get further hx, hx taken from EMS.

## 2020-10-15 NOTE — ED PROVIDER NOTE - NSFOLLOWUPINSTRUCTIONS_ED_ALL_ED_FT
Head Injury    WHAT YOU NEED TO KNOW:    What do I need to know about a head injury? A head injury can include your scalp, face, skull, or brain and range from mild to severe. Effects can appear immediately after the injury or develop later. The effects may last a short time or be permanent. Healthcare providers may want to check your recovery over time. Treatment may change as you recover or develop new health problems from the head injury.    What are the signs and symptoms of a head injury?   •An open scalp or skin wound, swelling, or bruising      •Mild to moderate headache      •Dizziness or loss of balance      •Nausea or vomiting      •Ringing in the ears or neck pain      •Confusion, especially right after the injury      •Change in mood, such as feeling restless or irritable      •Trouble thinking, remembering, or concentrating      •Drowsiness or decreased amount of energy      •Trouble sleeping      How is a head injury diagnosed?   •Tell your healthcare provider about your injury and symptoms. The provider will do an exam to check your brain function. He or she will check how your pupils react to light. He or she will check your memory, hand grasp, and balance.      •You may need x-rays, a CT scan, or an MRI to check for bleeding or major damage to your skull or brain. You may be given contrast liquid to help the pictures show up better. Tell the healthcare provider if you have ever had an allergic reaction to contrast liquid. Do not enter the MRI room with anything metal. Metal can cause serious injury. Tell the provider if you have any metal in or on your body.      How is a head injury treated? A mild head injury may not need to be treated. You may be given medicine to decrease pain. Other treatments may depend on how severe your head injury is. A concussion, hematoma (collection of blood), or traumatic brain injury may need both immediate and long-term treatment.    How can I manage my symptoms?   •Rest or do quiet activities. Limit your time watching TV, using the computer, or doing tasks that require a lot of thinking. Slowly return to your normal activities as directed. Do not play sports or do activities that may cause you to get hit in the head. Ask your healthcare provider when you can return to sports.      •Apply ice on your head for 15 to 20 minutes every hour or as directed. Use an ice pack, or put crushed ice in a plastic bag. Cover it with a towel before you apply it to your skin. Ice helps prevent tissue damage and decreases swelling and pain.      •Have someone stay with you for 24 hours , or as directed. This person can monitor you for problems and call for help if needed. When you are awake, the person should ask you a few questions every few hours to see if you are thinking clearly. An example is to ask your name or address.      What can I do to prevent another head injury?   •Wear a helmet that fits properly. Do this when you play sports, or ride a bike, scooter, or skateboard. Helmets help decrease your risk for a serious head injury. Talk to your healthcare provider about other ways you can protect yourself if you play sports.      •Wear your seatbelt every time you are in a car. This helps lower your risk for a head injury if you are in a car accident.      Call your local emergency number (911 in the ), or have someone else call if:   •You cannot be woken.      •You have a seizure.      •You stop responding to others or you faint.      •You have blurry or double vision.      •Your speech becomes slurred or confused.      •You have arm or leg weakness, loss of feeling, or new problems with coordination.      •Your pupils are larger than usual, or one pupil is a different size than the other.      •You have blood or clear fluid coming out of your ears or nose.      When should I seek immediate care?   •You have repeated or forceful vomiting.      •You feel confused.      •Your headache gets worse or becomes severe.      •You or someone caring for you notices that you are harder to wake than usual.      When should I call my doctor?   •Your symptoms last longer than 6 weeks after the injury.      •You have questions or concerns about your condition or care.      CARE AGREEMENT:    You have the right to help plan your care. Learn about your health condition and how it may be treated. Discuss treatment options with your healthcare providers to decide what care you want to receive. You always have the right to refuse treatment.

## 2020-10-15 NOTE — ED PROVIDER NOTE - PATIENT PORTAL LINK FT
You can access the FollowMyHealth Patient Portal offered by [facility] by registering at the following website: [email or web address]. By joining [facility]’s FollowMyHealth portal, you will also be able to view your health information using other applications (apps) compatible with our system. You can access the FollowMyHealth Patient Portal offered by [facility] by registering at the following website: [email or web address]. By joining [facility]’s FollowMyHealth portal, you will also be able to view your health information using other applications (apps) compatible with our system.

## 2020-10-15 NOTE — ED ADULT NURSE REASSESSMENT NOTE - NS ED NURSE REASSESS COMMENT FT1
Patient pending covid result for return to assisted living. Enhanced supervision maintained. Updated patient's son Robert Wertheimer by phone. Boxed meal provided and patient fed.

## 2020-10-15 NOTE — ED ADULT TRIAGE NOTE - CHIEF COMPLAINT QUOTE
As per [facility] crew " He fell off his wheelchair - we found him on the floor " Pt sent in from [facility] hx of dementia with behavioral disturbance, DM  for a fall incident - (+) redness on the left side of forehead

## 2020-10-15 NOTE — ED PROVIDER NOTE - CARE PROVIDER_API CALL
ADA BERNARDO  [id number]  [address] [place] [address]  Phone: [phone]  Fax: ()-  Follow Up Time: 1-3 Days

## 2020-10-15 NOTE — ED PROVIDER NOTE - ENMT, MLM
Airway patent, Nasal mucosa clear. Mouth with normal mucosa. Throat has no vesicles, no oropharyngeal exudates and uvula is midline. +contusion on left side of forehead, no rafat deformity, neck supple

## 2020-10-15 NOTE — ED ADULT NURSE NOTE - OBJECTIVE STATEMENT
Per transfer papers and EMS patient was found on the floor and had apparently fallen out of his wheelchair. Abrasion/hematoma noted left upper forehead. Unwitnessed fall unknown if any LOC. Per transfer papers and EMS patient was found on the floor and had apparently fallen out of his wheelchair. Abrasion/hematoma noted left upper forehead. Unwitnessed fall unknown if any LOC. Multiple small scabbed areas noted extremities, torso, buttocks. Patient with dementia and is unable to give any history.

## 2020-10-16 PROBLEM — F03.90 UNSPECIFIED DEMENTIA WITHOUT BEHAVIORAL DISTURBANCE: Chronic | Status: ACTIVE | Noted: 2020-08-15

## 2021-10-18 NOTE — PATIENT PROFILE ADULT - BRADEN SENSORY
[2) very limited

## 2022-01-01 NOTE — ED ADULT NURSE NOTE - CHIEF COMPLAINT QUOTE
As per [facility] crew " He fell off his wheelchair - we found him on the floor " Pt sent in from [facility] hx of dementia with behavioral disturbance, DM  for a fall incident - (+) redness on the left side of forehead
[FreeTextEntry1] : 1 MONTH OLD X WEIGHT CHECK-- DOING WELL\par GAINED 4 OZ SINCE LAST VISIT\par MOM CONCERNED ABOUT HOW MUCH TO FEED AND WHEN TO PROGRESS\par MOM WILL DO ALL FOLLOW UP APPTS WITH NEURO, SURGERY, ENT, GI,\par F/U 1 WEEK X HEP B\par

## 2022-01-06 NOTE — PROGRESS NOTE ADULT - MINUTES
General Surgery Progress Note:    Hospital Day # 15    ASSESSMENT:   1. Abdominal pain, generalized        Gurdeep Dia is a 67 year old male with readmission for abdominal pain after discharge history ofs/p exploratory laparotomy with small bowel resection, lysis of adhesions and repair of enterotomy with significant inflamed small bowel with areas of thinned mucosa on 11/30/2021  S/p exploratory laparotomy 12/3/2021 for small bowel perforation from ulcer  S/p IR placement of drain into pelvic abscess 12/9/2021. 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  Chest x-ray suspicious and pending Covid and influenza testing  -Do not see a reason to get a CT of the abdomen as of yet.  Will discuss with Dr. Richardson.  -May want to work on nausea and his gagging a little bit more   -Would recommend testing for Covid and influenza first and if he develops any abdominal pain increasing fever or his leukocytosis continues to climb tomorrow then a CT of the abdomen would be next step.  -Appreciate wound care nurse cares  -Malnutrition on TPN  -Recommended ice chips today but he can drink if he wants.    SUBJECTIVE:   Gurdeep Dia is not feeling any better in fact he states he has developed a fever this morning of 100.1.  He continues have significant gagging and nausea for the past 5 days.  Does not feel like medications are helpful.  He denies any abdominal pain.  Having regular bowel movements.  No change in output of fistula output.  He is coughing more but denies any shortness of breath or difficulty with tightness or chest pains.    Patient Vitals for the past 24 hrs:   BP Temp Temp src Pulse Resp SpO2   01/06/22 0810 111/76 100.1  F (37.8   C) Oral 98 18 (!) 88 %   01/05/22 2318 107/76 99.3  F (37.4  C) Oral 89 18 90 %   01/05/22 1600 119/78 98.9  F (37.2  C) Oral 92 20 93 %   01/05/22 1110 103/70 98.9  F (37.2  C) Oral 107 18 92 %       Physical Exam:  General: NAD, pleasant    ABD: Soft nontender and wound is the same as yesterday.      No results displayed because visit has over 200 results.   Recent Results (from the past 24 hour(s))   Asymptomatic COVID-19 Virus (Coronavirus) by PCR Nasopharyngeal    Collection Time: 01/05/22 11:37 AM    Specimen: Nasopharyngeal; Swab   Result Value Ref Range    SARS CoV2 PCR Negative Negative   Platelet count    Collection Time: 01/06/22  6:29 AM   Result Value Ref Range    Platelet Count 408 150 - 450 10e3/uL   Extra Red Top Tube    Collection Time: 01/06/22  6:29 AM   Result Value Ref Range    Hold Specimen JIC    CBC with platelets    Collection Time: 01/06/22  6:29 AM   Result Value Ref Range    WBC Count 14.5 (H) 4.0 - 11.0 10e3/uL    RBC Count 3.70 (L) 4.40 - 5.90 10e6/uL    Hemoglobin 10.7 (L) 13.3 - 17.7 g/dL    Hematocrit 34.4 (L) 40.0 - 53.0 %    MCV 93 78 - 100 fL    MCH 28.9 26.5 - 33.0 pg    MCHC 31.1 (L) 31.5 - 36.5 g/dL    RDW 16.9 (H) 10.0 - 15.0 %    Platelet Count 408 150 - 450 10e3/uL   Comprehensive metabolic panel    Collection Time: 01/06/22  6:29 AM   Result Value Ref Range    Sodium 138 136 - 145 mmol/L    Potassium 4.8 3.5 - 5.0 mmol/L    Chloride 100 98 - 107 mmol/L    Carbon Dioxide (CO2) 28 22 - 31 mmol/L    Anion Gap 10 5 - 18 mmol/L    Urea Nitrogen 29 (H) 8 - 22 mg/dL    Creatinine 0.79 0.70 - 1.30 mg/dL    Calcium 8.2 (L) 8.5 - 10.5 mg/dL    Glucose 159 (H) 70 - 125 mg/dL    Alkaline Phosphatase 162 (H) 45 - 120 U/L    AST 26 0 - 40 U/L    ALT 32 0 - 45 U/L    Protein Total 6.3 6.0 - 8.0 g/dL    Albumin 2.1 (L) 3.5 - 5.0 g/dL    Bilirubin Total 1.4 (H) 0.0 - 1.0 mg/dL    GFR Estimate >90 >60 mL/min/1.73m2   Glucose by meter    Collection Time: 01/06/22  8:19 AM   Result Value Ref  Range    GLUCOSE BY METER POCT 143 (H) 70 - 99 mg/dL               Demetrius Vogel PA-C   45

## 2022-06-01 NOTE — ED ADULT NURSE NOTE - NSINTERVENTIONOPT_GEN_ALL_ED
Enhanced Supervision/Hourly Rounding Benzoyl Peroxide Counseling: Patient counseled that medicine may cause skin irritation and bleach clothing.  In the event of skin irritation, the patient was advised to reduce the amount of the drug applied or use it less frequently.   The patient verbalized understanding of the proper use and possible adverse effects of benzoyl peroxide.  All of the patient's questions and concerns were addressed.

## 2022-09-07 NOTE — H&P ADULT - PROBLEM SELECTOR PROBLEM 4
FAMILY HISTORY:  No pertinent family history in first degree relatives    
Dementia with behavioral disturbance, unspecified dementia type

## 2022-10-14 NOTE — ED ADULT NURSE NOTE - NS ED NURSE LEVEL OF CONSCIOUSNESS MENTAL STATUS
Copied from Cady's charting in a separate encounter:    Patients wife called with the help of . She would like an update in regards to the US being scheduled sooner than December.      She would also like more information in regards to wound care. mentioned they have received a call from [facility] but is asking if Dr. Valencia recommends to stay within [facility] or ok to go to [facility].     Veronica will be sending in forms to be filled out to [facility]  
Doris at  [facility] is stating that they received an order for OP wound Care from Dr. Marie Britton a Hospitalist at [facility]. They are awaiting the pt to call them back an get him scheduled.   
First available is not until December, please advice   
Hi  We went Wound Care  They said it will be couple weeks of treatments and maybe next week will cup open to drain out then clean have to wait see next Friday if better  They want me let u know [facility] will have antibiotic called start with a … I will send u after we  RX at [facility] he has to take for 10 days  He has big hematoma no infection so that’s good  Thank you  Gely Hart      
Portal message from the pt's wife:    Camilla Cerrato  They called yesterday and left message to call back it was [facility] we were not home due saw Dr Valencia  Here is their number [phone]  Thank you   Gely    
Pt will be seen at [facility] OP wound care tomorrow at 1100. Please see the multiple portal messages with the pt and his wife r/t this. The pt's wife emailed in pictures of the pt's wound yesterday and today. These pictures were forwarded to wound care.   
Pt's wife is calling in and is stating that wound care at [facility] can't get them in until 10/20. She would like an OP Wound Care order placed for Gabrielle so that we can try to get him in sooner.   
Sent pt a portal message asking that they provide me the number for the [facility] wound care that called them today.  
Spoke to [facility]. Advised him of the wound care referral. He is stating that they have no open appts in the next two weeks. Implored him to try to get the pt in as soon as possible; he will do what he can but it would be based on if they have discharges or not.   
Spoke with Doris at [facility] and asked her if they can possibly see the pt sooner than 10/20. She is reporting that they have no sooner openings but that they will reach out to the pt if an cancellations occur. Right now the pt does have an appt with them at 0845 on 10/20.  
Spoke with Home Care Nurse Nadia Hartley. The pt would have to be home bound per Medicare to be seen by them. The pt is not homebound and the referral will be cancelled.   
Irritable/Confused/Awake/Alert

## 2023-01-05 NOTE — ED PROVIDER NOTE - PHYSICAL EXAMINATION
This was a shared visit with the DARIO. I reviewed and verified the documentation and independently performed the documented:
+ , EOMI, neck is supple, extremities full ROM intact

## 2023-03-03 NOTE — DISCHARGE NOTE PROVIDER - NSDCCPCAREPLAN_GEN_ALL_CORE_FT
PRINCIPAL DISCHARGE DIAGNOSIS  Diagnosis: Hyponatremia  Assessment and Plan of Treatment: resolved      SECONDARY DISCHARGE DIAGNOSES  Diagnosis: Altered mental status  Assessment and Plan of Treatment: back to baseline No

## 2023-08-01 NOTE — ED PROVIDER NOTE - CRANIAL NERVE AND PUPILLARY EXAM
Erythromycin Pregnancy And Lactation Text: This medication is Pregnancy Category B and is considered safe during pregnancy. It is also excreted in breast milk. extra-ocular movements intact

## 2024-07-08 NOTE — ED ADULT NURSE NOTE - NS PRO AD PATIENT TYPE
Head, normocephalic, atraumatic, Face, Face within normal limits, Ears, External ears within normal limits
Do Not Resuscitate (DNR)

## 2024-09-11 NOTE — ED ADULT NURSE NOTE - DISCHARGE DATE/TIME
21-Nov-2019 01:10

## 2024-10-25 NOTE — ED ADULT NURSE NOTE - NURSING SKIN RASH LOCATION #1
- h/o pregnancy loss at 16 weeks  - indeterminant APLAS work-up, saw MFM, would not recommend anticoagulation   - continue baby aspirin through pregnancy    trunk/groin

## 2024-11-16 NOTE — ED ADULT NURSE NOTE - NURSING MUSC JOINTS
Bed/Stretcher in lowest position, wheels locked, appropriate side rails in place/Call bell, personal items and telephone in reach/Instruct patient to call for assistance before getting out of bed/chair/stretcher/Non-slip footwear applied when patient is off stretcher/[place] to call system/Physically safe environment - no spills, clutter or unnecessary equipment/Purposeful proactive rounding/Room/bathroom lighting operational, light cord in reach no pain, swelling or deformity of joints

## 2025-03-14 NOTE — ED ADULT NURSE NOTE - EAR DISTURBANCES
Patient presents seeking wound check of L hand digits 3 and 4.  States he cut them with hedge clippers 2 days ago and was seen here for the same.    
normal